# Patient Record
Sex: FEMALE | Race: BLACK OR AFRICAN AMERICAN | Employment: UNEMPLOYED | URBAN - METROPOLITAN AREA
[De-identification: names, ages, dates, MRNs, and addresses within clinical notes are randomized per-mention and may not be internally consistent; named-entity substitution may affect disease eponyms.]

---

## 2019-12-04 ENCOUNTER — APPOINTMENT (OUTPATIENT)
Dept: GENERAL RADIOLOGY | Age: 69
End: 2019-12-04
Attending: EMERGENCY MEDICINE
Payer: MEDICARE

## 2019-12-04 ENCOUNTER — HOSPITAL ENCOUNTER (EMERGENCY)
Age: 69
Discharge: HOME | End: 2019-12-05
Attending: EMERGENCY MEDICINE
Payer: MEDICARE

## 2019-12-04 DIAGNOSIS — R07.89 MUSCULOSKELETAL CHEST PAIN: Primary | ICD-10-CM

## 2019-12-04 PROCEDURE — 80053 COMPREHEN METABOLIC PANEL: CPT

## 2019-12-04 PROCEDURE — 99285 EMERGENCY DEPT VISIT HI MDM: CPT

## 2019-12-04 PROCEDURE — 71046 X-RAY EXAM CHEST 2 VIEWS: CPT

## 2019-12-04 PROCEDURE — 85025 COMPLETE CBC W/AUTO DIFF WBC: CPT

## 2019-12-04 PROCEDURE — 93005 ELECTROCARDIOGRAM TRACING: CPT

## 2019-12-04 PROCEDURE — 83880 ASSAY OF NATRIURETIC PEPTIDE: CPT

## 2019-12-04 PROCEDURE — 85379 FIBRIN DEGRADATION QUANT: CPT

## 2019-12-04 PROCEDURE — 82550 ASSAY OF CK (CPK): CPT

## 2019-12-05 VITALS
DIASTOLIC BLOOD PRESSURE: 86 MMHG | RESPIRATION RATE: 23 BRPM | WEIGHT: 125 LBS | OXYGEN SATURATION: 100 % | HEART RATE: 66 BPM | HEIGHT: 62 IN | BODY MASS INDEX: 23 KG/M2 | SYSTOLIC BLOOD PRESSURE: 179 MMHG | TEMPERATURE: 98.2 F

## 2019-12-05 LAB
ALBUMIN SERPL-MCNC: 3.6 G/DL (ref 3.4–5)
ALBUMIN/GLOB SERPL: 1 {RATIO} (ref 0.8–1.7)
ALP SERPL-CCNC: 105 U/L (ref 45–117)
ALT SERPL-CCNC: 25 U/L (ref 13–56)
ANION GAP SERPL CALC-SCNC: 6 MMOL/L (ref 3–18)
AST SERPL-CCNC: 22 U/L (ref 10–38)
BASOPHILS # BLD: 0 K/UL (ref 0–0.1)
BASOPHILS NFR BLD: 1 % (ref 0–2)
BILIRUB SERPL-MCNC: 0.3 MG/DL (ref 0.2–1)
BNP SERPL-MCNC: 2046 PG/ML (ref 0–900)
BUN SERPL-MCNC: 13 MG/DL (ref 7–18)
BUN/CREAT SERPL: 15 (ref 12–20)
CALCIUM SERPL-MCNC: 8.6 MG/DL (ref 8.5–10.1)
CHLORIDE SERPL-SCNC: 109 MMOL/L (ref 100–111)
CK MB CFR SERPL CALC: 2.4 % (ref 0–4)
CK MB CFR SERPL CALC: 3.4 % (ref 0–4)
CK MB SERPL-MCNC: 3.8 NG/ML (ref 5–25)
CK MB SERPL-MCNC: 4.7 NG/ML (ref 5–25)
CK SERPL-CCNC: 137 U/L (ref 26–192)
CK SERPL-CCNC: 157 U/L (ref 26–192)
CO2 SERPL-SCNC: 26 MMOL/L (ref 21–32)
CREAT SERPL-MCNC: 0.89 MG/DL (ref 0.6–1.3)
D DIMER PPP FEU-MCNC: 0.27 UG/ML(FEU)
DIFFERENTIAL METHOD BLD: ABNORMAL
EOSINOPHIL # BLD: 0.1 K/UL (ref 0–0.4)
EOSINOPHIL NFR BLD: 2 % (ref 0–5)
ERYTHROCYTE [DISTWIDTH] IN BLOOD BY AUTOMATED COUNT: 13.5 % (ref 11.6–14.5)
GLOBULIN SER CALC-MCNC: 3.5 G/DL (ref 2–4)
GLUCOSE SERPL-MCNC: 104 MG/DL (ref 74–99)
HCT VFR BLD AUTO: 42.5 % (ref 35–45)
HGB BLD-MCNC: 13.8 G/DL (ref 12–16)
LYMPHOCYTES # BLD: 1.8 K/UL (ref 0.9–3.6)
LYMPHOCYTES NFR BLD: 32 % (ref 21–52)
MCH RBC QN AUTO: 28.3 PG (ref 24–34)
MCHC RBC AUTO-ENTMCNC: 32.5 G/DL (ref 31–37)
MCV RBC AUTO: 87.3 FL (ref 74–97)
MONOCYTES # BLD: 0.7 K/UL (ref 0.05–1.2)
MONOCYTES NFR BLD: 12 % (ref 3–10)
NEUTS SEG # BLD: 3.1 K/UL (ref 1.8–8)
NEUTS SEG NFR BLD: 53 % (ref 40–73)
PLATELET # BLD AUTO: 211 K/UL (ref 135–420)
PMV BLD AUTO: 11.2 FL (ref 9.2–11.8)
POTASSIUM SERPL-SCNC: 3.5 MMOL/L (ref 3.5–5.5)
PROT SERPL-MCNC: 7.1 G/DL (ref 6.4–8.2)
RBC # BLD AUTO: 4.87 M/UL (ref 4.2–5.3)
SODIUM SERPL-SCNC: 141 MMOL/L (ref 136–145)
TROPONIN I SERPL-MCNC: 0.02 NG/ML (ref 0–0.04)
TROPONIN I SERPL-MCNC: 0.02 NG/ML (ref 0–0.04)
WBC # BLD AUTO: 5.7 K/UL (ref 4.6–13.2)

## 2019-12-05 PROCEDURE — 93005 ELECTROCARDIOGRAM TRACING: CPT

## 2019-12-05 PROCEDURE — 82550 ASSAY OF CK (CPK): CPT

## 2019-12-05 NOTE — ED NOTES
RN in to assess pt. Repositioned for comfort. Additional needs assessed. No apparent distress. No additional needs expressed at this time. Resting comfortably, awaiting further orders/procedures.

## 2019-12-05 NOTE — ED TRIAGE NOTES
Pt presents to ED through triage with c/o left upper chest pain x 3days pt reports raking 13 bags of leaves 3 days ago. Pt denies SOB at this time. Pt speaking in complete sentences.

## 2019-12-05 NOTE — ED NOTES
Care assumed for discharge only. I have reviewed discharge instructions with the patient. The patient verbalized understanding. Patient armband removed and shredded. Pt in NAD at this time, no further needs expressed.

## 2019-12-05 NOTE — DISCHARGE INSTRUCTIONS
You were seen and evaluated in the Emergency Department. Please understand that your work up is not all encompassing and you should follow up with your primary care physician for further management and continuity of care. Please return to Emergency Department or seek medical attention immediately if you have acute worsening in your symptoms or develop chest pain, shortness of breath, repeated vomiting, fever, altered level of consciousness, coughing up blood, or start sweating and feel clammy. If you were prescribed any medicine for home, please take as prescribed by your health-care provider. If you were given any follow-up appointments or numbers to call, please do so as instructed. Avoid any tobacco products or excessive alcohol. Patient Education        Chest Pain: Care Instructions  Your Care Instructions    There are many things that can cause chest pain. Some are not serious and will get better on their own in a few days. But some kinds of chest pain need more testing and treatment. Your doctor may have recommended a follow-up visit in the next 8 to 12 hours. If you are not getting better, you may need more tests or treatment. Even though your doctor has released you, you still need to watch for any problems. The doctor carefully checked you, but sometimes problems can develop later. If you have new symptoms or if your symptoms do not get better, get medical care right away. If you have worse or different chest pain or pressure that lasts more than 5 minutes or you passed out (lost consciousness), call 911 or seek other emergency help right away. A medical visit is only one step in your treatment. Even if you feel better, you still need to do what your doctor recommends, such as going to all suggested follow-up appointments and taking medicines exactly as directed. This will help you recover and help prevent future problems. How can you care for yourself at home?   · Rest until you feel better. · Take your medicine exactly as prescribed. Call your doctor if you think you are having a problem with your medicine. · Do not drive after taking a prescription pain medicine. When should you call for help? Call 911 if:    · You passed out (lost consciousness).     · You have severe difficulty breathing.     · You have symptoms of a heart attack. These may include:  ? Chest pain or pressure, or a strange feeling in your chest.  ? Sweating. ? Shortness of breath. ? Nausea or vomiting. ? Pain, pressure, or a strange feeling in your back, neck, jaw, or upper belly or in one or both shoulders or arms. ? Lightheadedness or sudden weakness. ? A fast or irregular heartbeat. After you call 911, the  may tell you to chew 1 adult-strength or 2 to 4 low-dose aspirin. Wait for an ambulance. Do not try to drive yourself.    Call your doctor today if:    · You have any trouble breathing.     · Your chest pain gets worse.     · You are dizzy or lightheaded, or you feel like you may faint.     · You are not getting better as expected.     · You are having new or different chest pain. Where can you learn more? Go to http://issac-sheela.info/. Enter A120 in the search box to learn more about \"Chest Pain: Care Instructions. \"  Current as of: June 26, 2019  Content Version: 12.2  © 1796-9849 [employer]. Care instructions adapted under license by [employer] (which disclaims liability or warranty for this information). If you have questions about a medical condition or this instruction, always ask your healthcare professional. [address] any warranty or liability for your use of this information.

## 2019-12-05 NOTE — ED PROVIDER NOTES
EMERGENCY DEPARTMENT HISTORY AND PHYSICAL EXAM    Date: 12/4/2019  Patient Name: Araceli Murcia    History of Presenting Illness     Chief Complaint   Patient presents with    Chest Pain         History Provided By: Patient    Additional History (Context):   Araceli Murcia is a 68 y.o. female denies any past medical history, does not take any medications on a daily basis presents to the emergency department C/O left-sided chest pain that is intermittent since raking 13 back so believes 3 days ago. Reports pain with movement. Patient reports that she has been taken Tylenol with some relief. Denies any shortness of breath, diaphoresis, arm pain, jaw pain. States that her son insisted that she come to the emergency department to be evaluated. Pt denies normal pain, nausea, vomiting, and any other sxs or complaints. PCP: Shawn Dias MD        Past History     Past Medical History:  Past Medical History:   Diagnosis Date    Brain tumor ([address]     Hypertension        Past Surgical History:  Past Surgical History:   Procedure Laterality Date    NEUROLOGICAL PROCEDURE UNLISTED      Brain tumor       Family History:  No family history on file. Social History:  Social History     Tobacco Use    Smoking status: Never Smoker   Substance Use Topics    Alcohol use: No    Drug use: No       Allergies:  No Known Allergies      Review of Systems   Review of Systems   Constitutional: Negative for chills and fever. HENT: Negative for congestion, ear pain, sinus pain and sore throat. Eyes: Negative for pain and visual disturbance. Respiratory: Negative for cough and shortness of breath. Cardiovascular: Positive for chest pain. Negative for leg swelling. Gastrointestinal: Negative for abdominal pain, constipation, diarrhea, nausea and vomiting. Genitourinary: Negative for dysuria, hematuria, vaginal bleeding and vaginal discharge. Musculoskeletal: Negative for back pain and neck pain. Skin: Negative for rash and wound. Neurological: Negative for dizziness, tremors, weakness, light-headedness and numbness. All other systems reviewed and are negative. Physical Exam     Vitals:    12/04/19 2335 12/05/19 0109   BP: (!) 195/133 190/89   Pulse: 90 71   Resp: 18 20   Temp: 98.2 °F (36.8 °C)    SpO2: 100% 100%   Weight: 56.7 kg (125 lb)    Height: 5' 2\" (1.575 m)      Physical Exam  Chest:             Nursing note and vitals reviewed    Constitutional: Elderly -American female, no acute distress  Head: Normocephalic, Atraumatic  Eyes: Pupils are equal, round, and reactive to light, EOMI  Neck: Supple, non-tender  Cardiovascular: Regular rate and rhythm, no murmurs, rubs, or gallops  Chest: Normal work of breathing and chest excursion bilaterally  Lungs: Clear to ausculation bilaterally, no wheezes, no rhonchi  Abdomen: Soft, non tender, non distended, normoactive bowel sounds  Back: No evidence of trauma or deformity  Extremities: No evidence of trauma or deformity, no LE edema. No streaking erythema, vesicular lesions, ulcerations or bulla  Skin: Warm and dry, normal cap refill  Neuro: Alert and appropriate, CN intact, normal speech, moving all 4 extremities freely and symmetrically  Psychiatric: Normal mood and affect       Diagnostic Study Results     Labs -     Recent Results (from the past 12 hour(s))   CBC WITH AUTOMATED DIFF    Collection Time: 12/04/19 11:50 PM   Result Value Ref Range    WBC 5.7 4.6 - 13.2 K/uL    RBC 4.87 4.20 - 5.30 M/uL    HGB 13.8 12.0 - 16.0 g/dL    HCT 42.5 35.0 - 45.0 %    MCV 87.3 74.0 - 97.0 FL    MCH 28.3 24.0 - 34.0 PG    MCHC 32.5 31.0 - 37.0 g/dL    RDW 13.5 11.6 - 14.5 %    PLATELET 499 791 - 013 K/uL    MPV 11.2 9.2 - 11.8 FL    NEUTROPHILS 53 40 - 73 %    LYMPHOCYTES 32 21 - 52 %    MONOCYTES 12 (H) 3 - 10 %    EOSINOPHILS 2 0 - 5 %    BASOPHILS 1 0 - 2 %    ABS. NEUTROPHILS 3.1 1.8 - 8.0 K/UL    ABS. LYMPHOCYTES 1.8 0.9 - 3.6 K/UL    ABS. MONOCYTES 0.7 0.05 - 1.2 K/UL    ABS. EOSINOPHILS 0.1 0.0 - 0.4 K/UL    ABS. BASOPHILS 0.0 0.0 - 0.1 K/UL    DF AUTOMATED     CARDIAC PANEL,(CK, CKMB & TROPONIN)    Collection Time: 12/04/19 11:50 PM   Result Value Ref Range     26 - 192 U/L    CK - MB 4.7 (H) <3.6 ng/ml    CK-MB Index 3.4 0.0 - 4.0 %    Troponin-I, QT 0.02 0.0 - 3.790 NG/ML   METABOLIC PANEL, COMPREHENSIVE    Collection Time: 12/04/19 11:50 PM   Result Value Ref Range    Sodium 141 136 - 145 mmol/L    Potassium 3.5 3.5 - 5.5 mmol/L    Chloride 109 100 - 111 mmol/L    CO2 26 21 - 32 mmol/L    Anion gap 6 3.0 - 18 mmol/L    Glucose 104 (H) 74 - 99 mg/dL    BUN 13 7.0 - 18 MG/DL    Creatinine 0.89 0.6 - 1.3 MG/DL    BUN/Creatinine ratio 15 12 - 20      GFR est AA >60 >60 ml/min/1.73m2    GFR est non-AA >60 >60 ml/min/1.73m2    Calcium 8.6 8.5 - 10.1 MG/DL    Bilirubin, total 0.3 0.2 - 1.0 MG/DL    ALT (SGPT) 25 13 - 56 U/L    AST (SGOT) 22 10 - 38 U/L    Alk.  phosphatase 105 45 - 117 U/L    Protein, total 7.1 6.4 - 8.2 g/dL    Albumin 3.6 3.4 - 5.0 g/dL    Globulin 3.5 2.0 - 4.0 g/dL    A-G Ratio 1.0 0.8 - 1.7     D DIMER    Collection Time: 12/04/19 11:50 PM   Result Value Ref Range    D DIMER 0.27 <0.46 ug/ml(FEU)   NT-PRO BNP    Collection Time: 12/04/19 11:50 PM   Result Value Ref Range    NT pro-BNP 2,046 (H) 0 - 900 PG/ML   EKG, 12 LEAD, SUBSEQUENT    Collection Time: 12/05/19  1:03 AM   Result Value Ref Range    Ventricular Rate 65 BPM    Atrial Rate 65 BPM    P-R Interval 126 ms    QRS Duration 96 ms    Q-T Interval 428 ms    QTC Calculation (Bezet) 445 ms    Calculated P Axis 44 degrees    Calculated R Axis -7 degrees    Calculated T Axis 53 degrees    Diagnosis       Normal sinus rhythm  Left atrial enlargement  Left ventricular hypertrophy  Cannot rule out Septal infarct , age undetermined  T wave abnormality, consider lateral ischemia  Abnormal ECG  When compared with ECG of 19-MAY-2016 03:04,  T wave inversion more evident in Lateral leads     CARDIAC PANEL,(CK, CKMB & TROPONIN)    Collection Time: 12/05/19  1:05 AM   Result Value Ref Range     26 - 192 U/L    CK - MB 3.8 (H) <3.6 ng/ml    CK-MB Index 2.4 0.0 - 4.0 %    Troponin-I, QT 0.02 0.0 - 0.045 NG/ML       Radiologic Studies -   XR CHEST PA LAT    (Results Pending)   RADIOLOGY FINDINGS  Chest x-ray shows cardiomegaly but no acute process  Pending review by Radiologist  Recorded by Juan Vasquez, DO      CT Results  (Last 48 hours)    None        CXR Results  (Last 48 hours)    None            Medical Decision Making   I am the first provider for this patient. I reviewed the vital signs, available nursing notes, past medical history, past surgical history, family history and social history. Vital Signs-Reviewed the patient's vital signs. Pulse Oximetry Analysis -100 % on room air    Cardiac Monitor:  Rate: 90 bpm  Rhythm: Regular    EKG interpretation: (Preliminary)  11:54 PM   Sinus rhythm with PVCs at 80 bpm.  QTc 475 ms. No acute ST elevation    EKG interpretation: (Preliminary)  1:10 AM   Normal sinus rhythm at 65 bpm.  QTc 445 ms. No acute ST elevation    Records Reviewed: Nursing Notes and Old Medical Records    Provider Notes:   68 y.o. female presenting with left-sided chest pain after raking 13 back to believe 3 days ago. On exam patient is afebrile and not tachycardic. However noted to be hypertensive. Patient with no known history of hypertension. EKG showed no acute ST changes or T wave abnomalities concerning for ischemia. We will complete 2 sets of cardiac enzymes to evaluate for ACS. Differential includes gastritis, GERD, PUD, and must less likely, ACS. We will also obtain chest x-ray to evaluate for any pulmonary pathology. Patient is not tachycardic, no c/o of calf pain and not hypoxic and hx is not concerning for PE. Patient cannot be PERC out due to age however is low risk on Well's, will evaluate with D dimer.  Patient has minimal risk factors and a low HEART score of 2 for age, with low risk of MACE. I anticipate with no ST changes or T wave abnormalities, and negative Troponins that patient may be discharged for outpatient follow up. Procedures:  Procedures    ED Course:   11:54 PM   Initial assessment performed. The patients presenting problems have been discussed, and they are in agreement with the care plan formulated and outlined with them. I have encouraged them to ask questions as they arise throughout their visit. 1:34 AM  2 sets of cardiac enzymes were negative. D-dimer within normal limits. CXR NAP. On re-assessment pt reports improved sx. After at length discussion with patient in regards to HEART score of 2, minimal risk of MACE and collaborative shared decision making, discharge and close outpatient follow up was deemed to be appropriate and patient was in agreement to discharge plan. Diagnosis and Disposition       DISCHARGE NOTE:  1:34 AM    Delicia Brandt's  results have been reviewed with her. She has been counseled regarding her diagnosis, treatment, and plan. She verbally conveys understanding and agreement of the signs, symptoms, diagnosis, treatment and prognosis and additionally agrees to follow up as discussed. She also agrees with the care-plan and conveys that all of her questions have been answered. I have also provided discharge instructions for her that include: educational information regarding their diagnosis and treatment, and list of reasons why they would want to return to the ED prior to their follow-up appointment, should her condition change. She has been provided with education for proper emergency department utilization. CLINICAL IMPRESSION:    1. Musculoskeletal chest pain        PLAN:  1. D/C Home  2. There are no discharge medications for this patient.     3.   Follow-up Information     Follow up With Specialties Details Why Contact Info    Kaur Vargas MD Internal Medicine Schedule an appointment as soon as possible for a visit in 2 days  [address]  [address]      Susana Candelario MD Cardiology, Internal Medicine Schedule an appointment as soon as possible for a visit in 2 days  [address] [address]  [phone]      [facility] Emergency Medicine  As needed if symptoms worsen [address] [place] [address]  [phone]        ____________________________________     Please note that this dictation was completed with [employer], the computer voice recognition software. Quite often unanticipated grammatical, syntax, homophones, and other interpretive errors are inadvertently transcribed by the computer software. Please disregard these errors. Please excuse any errors that have escaped final proofreading.

## 2019-12-08 LAB
ATRIAL RATE: 65 BPM
ATRIAL RATE: 80 BPM
CALCULATED P AXIS, ECG09: 36 DEGREES
CALCULATED P AXIS, ECG09: 44 DEGREES
CALCULATED R AXIS, ECG10: -7 DEGREES
CALCULATED R AXIS, ECG10: -9 DEGREES
CALCULATED T AXIS, ECG11: 13 DEGREES
CALCULATED T AXIS, ECG11: 53 DEGREES
DIAGNOSIS, 93000: NORMAL
DIAGNOSIS, 93000: NORMAL
P-R INTERVAL, ECG05: 124 MS
P-R INTERVAL, ECG05: 126 MS
Q-T INTERVAL, ECG07: 412 MS
Q-T INTERVAL, ECG07: 428 MS
QRS DURATION, ECG06: 96 MS
QRS DURATION, ECG06: 98 MS
QTC CALCULATION (BEZET), ECG08: 445 MS
QTC CALCULATION (BEZET), ECG08: 475 MS
VENTRICULAR RATE, ECG03: 65 BPM
VENTRICULAR RATE, ECG03: 80 BPM

## 2019-12-09 ENCOUNTER — HOSPITAL ENCOUNTER (EMERGENCY)
Age: 69
Discharge: HOME | End: 2019-12-09
Attending: EMERGENCY MEDICINE
Payer: MEDICARE

## 2019-12-09 VITALS
WEIGHT: 124 LBS | HEART RATE: 77 BPM | TEMPERATURE: 99.2 F | OXYGEN SATURATION: 100 % | BODY MASS INDEX: 22.82 KG/M2 | DIASTOLIC BLOOD PRESSURE: 85 MMHG | HEIGHT: 62 IN | RESPIRATION RATE: 20 BRPM | SYSTOLIC BLOOD PRESSURE: 175 MMHG

## 2019-12-09 DIAGNOSIS — B02.9 HERPES ZOSTER WITHOUT COMPLICATION: Primary | ICD-10-CM

## 2019-12-09 PROCEDURE — 99282 EMERGENCY DEPT VISIT SF MDM: CPT

## 2019-12-09 RX ORDER — VALACYCLOVIR HYDROCHLORIDE 1 G/1
1000 TABLET, FILM COATED ORAL 3 TIMES DAILY
Qty: 21 TAB | Refills: 0 | Status: SHIPPED | OUTPATIENT
Start: 2019-12-09 | End: 2019-12-16

## 2019-12-09 NOTE — DISCHARGE INSTRUCTIONS
Patient Education        Shingles: Care Instructions  Your Care Instructions    Shingles (herpes zoster) causes pain and a blistered rash. The rash can appear anywhere on the body but will be on only one side of the body, the left or right. It will be in a band, a strip, or a small area. The pain can be very severe. Shingles can also cause tingling or itching in the area of the rash. The blisters scab over after a few days and heal in 2 to 4 weeks. Medicines can help you feel better and may help prevent more serious problems caused by shingles. Shingles is caused by the same virus that causes chickenpox. When you have chickenpox, the virus gets into your nerve roots and stays there (becomes dormant) long after you get over the chickenpox. If the virus becomes active again, it can cause shingles. Follow-up care is a key part of your treatment and safety. Be sure to make and go to all appointments, and call your doctor if you are having problems. It's also a good idea to know your test results and keep a list of the medicines you take. How can you care for yourself at home? · Be safe with medicines. Take your medicines exactly as prescribed. Call your doctor if you think you are having a problem with your medicine. Antiviral medicine helps you get better faster. · Try not to scratch or pick at the blisters. They will crust over and fall off on their own if you leave them alone. · Put cool, wet cloths on the area to relieve pain and itching. You can also use calamine lotion. Try not to use so much lotion that it cakes and is hard to get off. · Put cornstarch or baking soda on the sores to help dry them out so they heal faster. · Do not use thick ointment, such as petroleum jelly, on the sores. This will keep them from drying and healing. · To help remove loose crusts, soak them in tap water. This can help decrease oozing, and dry and soothe the skin.   · Take an over-the-counter pain medicine, such as acetaminophen (Tylenol), ibuprofen (Advil, Motrin), or naproxen (Aleve). Read and follow all instructions on the label. · Avoid close contact with people until the blisters have healed. It is very important for you to avoid contact with anyone who has never had chickenpox or the chickenpox vaccine. Pregnant women, young babies, and anyone else who has a hard time fighting infection (such as someone with HIV, diabetes, or cancer) is especially at risk. When should you call for help? Call your doctor now or seek immediate medical care if:    · You have a new or higher fever.     · You have a severe headache and a stiff neck.     · You lose the ability to think clearly.     · The rash spreads to your forehead, nose, eyes, or eyelids.     · You have eye pain, or your vision gets worse.     · You have new pain in your face, or you cannot move the muscles in your face.     · Blisters spread to new parts of your body.    Watch closely for changes in your health, and be sure to contact your doctor if:    · The rash has not healed after 2 to 4 weeks.     · You still have pain after the rash has healed. Where can you learn more? Go to http://issac-sheela.info/. Ryan Cunningham in the search box to learn more about \"Shingles: Care Instructions. \"  Current as of: June 9, 2019  Content Version: 12.2  © 4483-0898 [employer]. Care instructions adapted under license by [employer] (which disclaims liability or warranty for this information). If you have questions about a medical condition or this instruction, always ask your healthcare professional. [address] any warranty or liability for your use of this information.

## 2019-12-09 NOTE — LETTER
[facility] [place] 
[facility] EMERGENCY DEPT 
[address] [address] [phone] Work/School Note Date: 12/9/2019 To Whom It May concern: 
 
Brenda Munoz was seen and treated today in the emergency room by the following provider(s): 
Attending Provider: Veda Robles MD 
Physician Assistant: KIRK Wong. Brenda Munoz may be excused from work for 2 days Sincerely, 
 
 
 
 
KIKR Hughes

## 2019-12-09 NOTE — ED PROVIDER NOTES
EMERGENCY DEPARTMENT HISTORY AND PHYSICAL EXAM    Date: 12/9/2019  Patient Name: Gary Shirley    History of Presenting Illness     Chief Complaint   Patient presents with    Rash         History Provided By: Patient    Gary Shirley is a 68 y.o. female who presents to the emergency department C/O rash. Patient states she was seen in this facility 5 days ago for some left-sided chest pain. Patient states she thought that maybe she had pulled a muscle while raking leaves. Patient states she had some tests done and they put some stickers on her chest and shortly after she developed some small itchy area on the left side of her chest wall. At that time she was told that she was having an allergic reaction to the stickers placed on her chest wall. Patient states the next couple of days she has had increasing pain and a rash to the left side of her chest.  Patient expresses fear for possible allergic reaction. Pt denies fall, new injury, shortness of breath, fever, and any other sxs or complaints. PCP: Kuldeep Anders MD        Past History     Past Medical History:  Past Medical History:   Diagnosis Date    [facility] tumor ([facility] [address]     Hypertension        Past Surgical History:  Past Surgical History:   Procedure Laterality Date    NEUROLOGICAL PROCEDURE UNLISTED      Brain tumor       Family History:  History reviewed. No pertinent family history. Social History:  Social History     Tobacco Use    Smoking status: Never Smoker    Smokeless tobacco: Never Used   Substance Use Topics    Alcohol use: No    Drug use: No       Allergies:  No Known Allergies      Review of Systems   Review of Systems   Constitutional: Negative for fever. Respiratory: Negative for shortness of breath. Cardiovascular: Positive for chest pain. Skin: Positive for rash. All other systems reviewed and are negative.       Physical Exam     Vitals:    12/09/19 1641   BP: 175/85   Pulse: 77   Resp: 20   Temp: 99.2 °F (37.3 °C)   SpO2: 100%   Weight: 56.2 kg (124 lb)   Height: 5' 2\" (1.575 m)     Physical Exam  Nursing note reviewed. Constitutional:       General: She is not in acute distress. Appearance: Normal appearance. She is not ill-appearing. HENT:      Head: Normocephalic and atraumatic. Nose: Nose normal.   Eyes:      Extraocular Movements: Extraocular movements intact. Conjunctiva/sclera: Conjunctivae normal.      Pupils: Pupils are equal, round, and reactive to light. Neck:      Musculoskeletal: Normal range of motion and neck supple. Cardiovascular:      Rate and Rhythm: Normal rate and regular rhythm. Pulmonary:      Effort: Pulmonary effort is normal.      Breath sounds: Normal breath sounds. Musculoskeletal: Normal range of motion. Skin:     General: Skin is warm. Capillary Refill: Capillary refill takes less than 2 seconds. Findings: Rash present. Comments: Vesicular rash in above distribution consistent with zoster   Neurological:      General: No focal deficit present. Mental Status: She is alert and oriented to person, place, and time. Psychiatric:         Mood and Affect: Mood normal.         Behavior: Behavior normal.           Diagnostic Study Results     Labs -   No results found for this or any previous visit (from the past 12 hour(s)). Radiologic Studies -   No orders to display     CT Results  (Last 48 hours)    None        CXR Results  (Last 48 hours)    None          Medications given in the ED-  Medications - No data to display      Medical Decision Making   I am the first provider for this patient. I reviewed the vital signs, available nursing notes, past medical history, past surgical history, family history and social history. Vital Signs-Reviewed the patient's vital signs. Records Reviewed: Nursing Notes    Procedures:  Procedures    ED Course:   5:50 PM   Initial assessment performed.  The patients presenting problems have been discussed, and they are in agreement with the care plan formulated and outlined with them. I have encouraged them to ask questions as they arise throughout their visit. Discussion: 68 y.o. female uncomplicated zoster rash. Plan for antivirals. PCP follow-up and strict return precautions discussed. Diagnosis and Disposition       DISCHARGE NOTE:  Haroon Brandt's  results have been reviewed with her. She has been counseled regarding her diagnosis, treatment, and plan. She verbally conveys understanding and agreement of the signs, symptoms, diagnosis, treatment and prognosis and additionally agrees to follow up as discussed. She also agrees with the care-plan and conveys that all of her questions have been answered. I have also provided discharge instructions for her that include: educational information regarding their diagnosis and treatment, and list of reasons why they would want to return to the ED prior to their follow-up appointment, should her condition change. She has been provided with education for proper emergency department utilization. CLINICAL IMPRESSION:    1. Herpes zoster without complication        PLAN:  1. D/C Home  2. Current Discharge Medication List      START taking these medications    Details   valACYclovir (VALTREX) 1 gram tablet Take 1 Tab by mouth three (3) times daily for 7 days. Qty: 21 Tab, Refills: 0           3. Follow-up Information     Follow up With Specialties Details Why Contact Info    Irena Vargas MD Internal Medicine Schedule an appointment as soon as possible for a visit  [address] [place] [address],[address]      [facility] EMERGENCY DEPT Emergency Medicine  As needed, If symptoms worsen [address] [place] [id number] [phone]                  Please note that this dictation was completed with [employer], the [employer] voice recognition software.   Quite often unanticipated grammatical, syntax, homophones, and other interpretive errors are inadvertently transcribed by the computer software. Please disregard these errors. Please excuse any errors that have escaped final proofreading.

## 2019-12-09 NOTE — ED TRIAGE NOTES
Patient was seen here the other night for chest pain and states that she had a reaction to the EKG leads. Patient noted to have blisters to the left side under breast and on stomach.

## 2020-05-30 ENCOUNTER — APPOINTMENT (OUTPATIENT)
Dept: GENERAL RADIOLOGY | Age: 70
DRG: 193 | End: 2020-05-30
Attending: EMERGENCY MEDICINE
Payer: MEDICARE

## 2020-05-30 ENCOUNTER — HOSPITAL ENCOUNTER (INPATIENT)
Age: 70
LOS: 2 days | Discharge: HOME | DRG: 193 | End: 2020-06-01
Attending: EMERGENCY MEDICINE | Admitting: HOSPITALIST
Payer: MEDICARE

## 2020-05-30 DIAGNOSIS — J96.01 ACUTE RESPIRATORY FAILURE WITH HYPOXIA (HCC): Primary | ICD-10-CM

## 2020-05-30 DIAGNOSIS — Z20.822 SUSPECTED COVID-19 VIRUS INFECTION: ICD-10-CM

## 2020-05-30 DIAGNOSIS — R09.02 HYPOXIA: ICD-10-CM

## 2020-05-30 PROBLEM — J96.00 ACUTE RESPIRATORY FAILURE (HCC): Status: ACTIVE | Noted: 2020-05-30

## 2020-05-30 LAB
ALBUMIN SERPL-MCNC: 3.6 G/DL (ref 3.4–5)
ALBUMIN/GLOB SERPL: 0.9 {RATIO} (ref 0.8–1.7)
ALP SERPL-CCNC: 126 U/L (ref 45–117)
ALT SERPL-CCNC: 79 U/L (ref 13–56)
ANION GAP SERPL CALC-SCNC: 8 MMOL/L (ref 3–18)
APPEARANCE UR: CLEAR
APTT PPP: <20 SEC (ref 23–36.4)
ARTERIAL PATENCY WRIST A: ABNORMAL
AST SERPL-CCNC: 73 U/L (ref 10–38)
ATRIAL RATE: 126 BPM
B PERT DNA SPEC QL NAA+PROBE: NOT DETECTED
BACTERIA URNS QL MICRO: ABNORMAL /HPF
BASE EXCESS BLD CALC-SCNC: 0 MMOL/L
BASOPHILS # BLD: 0 K/UL (ref 0–0.1)
BASOPHILS NFR BLD: 0 % (ref 0–2)
BDY SITE: ABNORMAL
BILIRUB SERPL-MCNC: 0.5 MG/DL (ref 0.2–1)
BILIRUB UR QL: NEGATIVE
BNP SERPL-MCNC: 5062 PG/ML (ref 0–900)
BORDETELLA PARAPERTUSSIS PCR, BORPAR: NOT DETECTED
BUN SERPL-MCNC: 19 MG/DL (ref 7–18)
BUN/CREAT SERPL: 22 (ref 12–20)
C PNEUM DNA SPEC QL NAA+PROBE: NOT DETECTED
CALCIUM SERPL-MCNC: 9.2 MG/DL (ref 8.5–10.1)
CALCULATED P AXIS, ECG09: 67 DEGREES
CALCULATED R AXIS, ECG10: 34 DEGREES
CALCULATED T AXIS, ECG11: 150 DEGREES
CHLORIDE SERPL-SCNC: 110 MMOL/L (ref 100–111)
CK MB CFR SERPL CALC: 2.5 % (ref 0–4)
CK MB SERPL-MCNC: 3.7 NG/ML (ref 5–25)
CK SERPL-CCNC: 146 U/L (ref 26–192)
CO2 SERPL-SCNC: 22 MMOL/L (ref 21–32)
COLOR UR: YELLOW
CREAT SERPL-MCNC: 0.88 MG/DL (ref 0.6–1.3)
CRP SERPL-MCNC: <0.3 MG/DL (ref 0–0.3)
D DIMER PPP FEU-MCNC: >20 UG/ML(FEU)
DIAGNOSIS, 93000: NORMAL
DIFFERENTIAL METHOD BLD: NORMAL
EOSINOPHIL # BLD: 0.1 K/UL (ref 0–0.4)
EOSINOPHIL NFR BLD: 2 % (ref 0–5)
EPITH CASTS URNS QL MICRO: ABNORMAL /LPF (ref 0–5)
ERYTHROCYTE [DISTWIDTH] IN BLOOD BY AUTOMATED COUNT: 13.9 % (ref 11.6–14.5)
FERRITIN SERPL-MCNC: 63 NG/ML (ref 8–388)
FIBRINOGEN PPP-MCNC: 261 MG/DL (ref 210–451)
FLUAV H1 2009 PAND RNA SPEC QL NAA+PROBE: NOT DETECTED
FLUAV H1 RNA SPEC QL NAA+PROBE: NOT DETECTED
FLUAV H3 RNA SPEC QL NAA+PROBE: NOT DETECTED
FLUAV SUBTYP SPEC NAA+PROBE: NOT DETECTED
FLUBV RNA SPEC QL NAA+PROBE: NOT DETECTED
GAS FLOW.O2 O2 DELIVERY SYS: ABNORMAL L/MIN
GLOBULIN SER CALC-MCNC: 3.9 G/DL (ref 2–4)
GLUCOSE SERPL-MCNC: 226 MG/DL (ref 74–99)
GLUCOSE UR STRIP.AUTO-MCNC: NEGATIVE MG/DL
HADV DNA SPEC QL NAA+PROBE: NOT DETECTED
HCO3 BLD-SCNC: 25.2 MMOL/L (ref 22–26)
HCOV 229E RNA SPEC QL NAA+PROBE: NOT DETECTED
HCOV HKU1 RNA SPEC QL NAA+PROBE: NOT DETECTED
HCOV NL63 RNA SPEC QL NAA+PROBE: NOT DETECTED
HCOV OC43 RNA SPEC QL NAA+PROBE: NOT DETECTED
HCT VFR BLD AUTO: 42.2 % (ref 35–45)
HGB BLD-MCNC: 13.5 G/DL (ref 12–16)
HGB UR QL STRIP: NEGATIVE
HMPV RNA SPEC QL NAA+PROBE: NOT DETECTED
HPIV1 RNA SPEC QL NAA+PROBE: NOT DETECTED
HPIV2 RNA SPEC QL NAA+PROBE: NOT DETECTED
HPIV3 RNA SPEC QL NAA+PROBE: NOT DETECTED
HPIV4 RNA SPEC QL NAA+PROBE: NOT DETECTED
INR PPP: 1.1 (ref 0.8–1.2)
KETONES UR QL STRIP.AUTO: NEGATIVE MG/DL
L PNEUMO AG UR QL IA: NEGATIVE
LACTATE SERPL-SCNC: 1.8 MMOL/L (ref 0.4–2)
LDH SERPL L TO P-CCNC: 272 U/L (ref 81–234)
LEUKOCYTE ESTERASE UR QL STRIP.AUTO: ABNORMAL
LYMPHOCYTES # BLD: 1.1 K/UL (ref 0.9–3.6)
LYMPHOCYTES NFR BLD: 21 % (ref 21–52)
M PNEUMO DNA SPEC QL NAA+PROBE: NOT DETECTED
MAGNESIUM SERPL-MCNC: 2 MG/DL (ref 1.6–2.6)
MCH RBC QN AUTO: 28.8 PG (ref 24–34)
MCHC RBC AUTO-ENTMCNC: 32 G/DL (ref 31–37)
MCV RBC AUTO: 90 FL (ref 74–97)
MONOCYTES # BLD: 0.3 K/UL (ref 0.05–1.2)
MONOCYTES NFR BLD: 6 % (ref 3–10)
MUCOUS THREADS URNS QL MICRO: POSITIVE /LPF
NEUTS SEG # BLD: 3.7 K/UL (ref 1.8–8)
NEUTS SEG NFR BLD: 71 % (ref 40–73)
NITRITE UR QL STRIP.AUTO: NEGATIVE
O2/TOTAL GAS SETTING VFR VENT: 100 %
P-R INTERVAL, ECG05: 122 MS
PCO2 BLD: 46.1 MMHG (ref 35–45)
PH BLD: 7.35 [PH] (ref 7.35–7.45)
PH UR STRIP: 7 [PH] (ref 5–8)
PHOSPHATE SERPL-MCNC: 3.1 MG/DL (ref 2.5–4.9)
PLATELET # BLD AUTO: 201 K/UL (ref 135–420)
PMV BLD AUTO: 11.2 FL (ref 9.2–11.8)
PO2 BLD: 173 MMHG (ref 80–100)
POTASSIUM SERPL-SCNC: 3.6 MMOL/L (ref 3.5–5.5)
PROCALCITONIN SERPL-MCNC: <0.05 NG/ML
PROT SERPL-MCNC: 7.5 G/DL (ref 6.4–8.2)
PROT UR STRIP-MCNC: ABNORMAL MG/DL
PROTHROMBIN TIME: 13.6 SEC (ref 11.5–15.2)
Q-T INTERVAL, ECG07: 322 MS
QRS DURATION, ECG06: 92 MS
QTC CALCULATION (BEZET), ECG08: 466 MS
RBC # BLD AUTO: 4.69 M/UL (ref 4.2–5.3)
RBC #/AREA URNS HPF: ABNORMAL /HPF (ref 0–5)
RSV RNA SPEC QL NAA+PROBE: NOT DETECTED
RV+EV RNA SPEC QL NAA+PROBE: NOT DETECTED
S PNEUM AG UR QL: NEGATIVE
SALICYLATES SERPL-MCNC: <1.7 MG/DL (ref 2.8–20)
SAO2 % BLD: 99 % (ref 92–97)
SERVICE CMNT-IMP: ABNORMAL
SODIUM SERPL-SCNC: 140 MMOL/L (ref 136–145)
SP GR UR REFRACTOMETRY: 1.02 (ref 1–1.03)
SPECIMEN TYPE: ABNORMAL
TOTAL RESP. RATE, ITRR: 36
TROPONIN I SERPL-MCNC: 0.03 NG/ML (ref 0–0.04)
UROBILINOGEN UR QL STRIP.AUTO: 0.2 EU/DL (ref 0.2–1)
VENTRICULAR RATE, ECG03: 126 BPM
WBC # BLD AUTO: 5.2 K/UL (ref 4.6–13.2)
WBC URNS QL MICRO: ABNORMAL /HPF (ref 0–5)

## 2020-05-30 PROCEDURE — 96375 TX/PRO/DX INJ NEW DRUG ADDON: CPT

## 2020-05-30 PROCEDURE — 82550 ASSAY OF CK (CPK): CPT

## 2020-05-30 PROCEDURE — 94761 N-INVAS EAR/PLS OXIMETRY MLT: CPT

## 2020-05-30 PROCEDURE — 82728 ASSAY OF FERRITIN: CPT

## 2020-05-30 PROCEDURE — 74011250636 HC RX REV CODE- 250/636: Performed by: FAMILY MEDICINE

## 2020-05-30 PROCEDURE — 77010033678 HC OXYGEN DAILY

## 2020-05-30 PROCEDURE — 82803 BLOOD GASES ANY COMBINATION: CPT

## 2020-05-30 PROCEDURE — 84145 PROCALCITONIN (PCT): CPT

## 2020-05-30 PROCEDURE — 80053 COMPREHEN METABOLIC PANEL: CPT

## 2020-05-30 PROCEDURE — 0100U RESPIRATORY PANEL,PCR,NASOPHARYNGEAL: CPT

## 2020-05-30 PROCEDURE — 36600 WITHDRAWAL OF ARTERIAL BLOOD: CPT

## 2020-05-30 PROCEDURE — 87450 LEGIONELLA PNEUMOPHILA AG, URINE: CPT

## 2020-05-30 PROCEDURE — 96374 THER/PROPH/DIAG INJ IV PUSH: CPT

## 2020-05-30 PROCEDURE — 87635 SARS-COV-2 COVID-19 AMP PRB: CPT

## 2020-05-30 PROCEDURE — 85384 FIBRINOGEN ACTIVITY: CPT

## 2020-05-30 PROCEDURE — 99285 EMERGENCY DEPT VISIT HI MDM: CPT

## 2020-05-30 PROCEDURE — 74011250636 HC RX REV CODE- 250/636: Performed by: EMERGENCY MEDICINE

## 2020-05-30 PROCEDURE — 86140 C-REACTIVE PROTEIN: CPT

## 2020-05-30 PROCEDURE — 83605 ASSAY OF LACTIC ACID: CPT

## 2020-05-30 PROCEDURE — 80307 DRUG TEST PRSMV CHEM ANLYZR: CPT

## 2020-05-30 PROCEDURE — 87040 BLOOD CULTURE FOR BACTERIA: CPT

## 2020-05-30 PROCEDURE — 71045 X-RAY EXAM CHEST 1 VIEW: CPT

## 2020-05-30 PROCEDURE — 85610 PROTHROMBIN TIME: CPT

## 2020-05-30 PROCEDURE — 87449 NOS EACH ORGANISM AG IA: CPT

## 2020-05-30 PROCEDURE — 83880 ASSAY OF NATRIURETIC PEPTIDE: CPT

## 2020-05-30 PROCEDURE — 83735 ASSAY OF MAGNESIUM: CPT

## 2020-05-30 PROCEDURE — 93005 ELECTROCARDIOGRAM TRACING: CPT

## 2020-05-30 PROCEDURE — 87086 URINE CULTURE/COLONY COUNT: CPT

## 2020-05-30 PROCEDURE — 65660000000 HC RM CCU STEPDOWN

## 2020-05-30 PROCEDURE — 85730 THROMBOPLASTIN TIME PARTIAL: CPT

## 2020-05-30 PROCEDURE — 85379 FIBRIN DEGRADATION QUANT: CPT

## 2020-05-30 PROCEDURE — 81001 URINALYSIS AUTO W/SCOPE: CPT

## 2020-05-30 PROCEDURE — 84100 ASSAY OF PHOSPHORUS: CPT

## 2020-05-30 PROCEDURE — 74011000250 HC RX REV CODE- 250: Performed by: EMERGENCY MEDICINE

## 2020-05-30 PROCEDURE — 83615 LACTATE (LD) (LDH) ENZYME: CPT

## 2020-05-30 PROCEDURE — 74011250637 HC RX REV CODE- 250/637: Performed by: FAMILY MEDICINE

## 2020-05-30 PROCEDURE — 85025 COMPLETE CBC W/AUTO DIFF WBC: CPT

## 2020-05-30 RX ORDER — ONDANSETRON 2 MG/ML
4 INJECTION INTRAMUSCULAR; INTRAVENOUS
Status: DISCONTINUED | OUTPATIENT
Start: 2020-05-30 | End: 2020-06-01 | Stop reason: HOSPADM

## 2020-05-30 RX ORDER — ASCORBIC ACID 250 MG
500 TABLET ORAL EVERY 6 HOURS
Status: DISCONTINUED | OUTPATIENT
Start: 2020-05-30 | End: 2020-06-01

## 2020-05-30 RX ORDER — ACETAMINOPHEN 325 MG/1
650 TABLET ORAL
Status: DISCONTINUED | OUTPATIENT
Start: 2020-05-30 | End: 2020-06-01 | Stop reason: HOSPADM

## 2020-05-30 RX ORDER — METOPROLOL TARTRATE 25 MG/1
12.5 TABLET, FILM COATED ORAL EVERY 12 HOURS
Status: DISCONTINUED | OUTPATIENT
Start: 2020-05-30 | End: 2020-06-01 | Stop reason: HOSPADM

## 2020-05-30 RX ORDER — ENOXAPARIN SODIUM 100 MG/ML
30 INJECTION SUBCUTANEOUS EVERY 12 HOURS
Status: DISCONTINUED | OUTPATIENT
Start: 2020-05-30 | End: 2020-06-01 | Stop reason: HOSPADM

## 2020-05-30 RX ORDER — HYDROCHLOROTHIAZIDE 25 MG/1
25 TABLET ORAL DAILY
Status: DISCONTINUED | OUTPATIENT
Start: 2020-05-30 | End: 2020-06-01 | Stop reason: HOSPADM

## 2020-05-30 RX ORDER — MELATONIN
2000 DAILY
Status: DISCONTINUED | OUTPATIENT
Start: 2020-05-30 | End: 2020-06-01

## 2020-05-30 RX ORDER — ZINC SULFATE 50(220)MG
1 CAPSULE ORAL DAILY
Status: DISCONTINUED | OUTPATIENT
Start: 2020-05-30 | End: 2020-06-01

## 2020-05-30 RX ORDER — CHOLECALCIFEROL (VITAMIN D3) 125 MCG
5 CAPSULE ORAL
Status: DISCONTINUED | OUTPATIENT
Start: 2020-05-30 | End: 2020-06-01

## 2020-05-30 RX ORDER — NALOXONE HYDROCHLORIDE 0.4 MG/ML
0.4 INJECTION, SOLUTION INTRAMUSCULAR; INTRAVENOUS; SUBCUTANEOUS AS NEEDED
Status: DISCONTINUED | OUTPATIENT
Start: 2020-05-30 | End: 2020-06-01 | Stop reason: HOSPADM

## 2020-05-30 RX ORDER — LORAZEPAM 2 MG/ML
1 INJECTION INTRAMUSCULAR
Status: COMPLETED | OUTPATIENT
Start: 2020-05-30 | End: 2020-05-30

## 2020-05-30 RX ORDER — OXYCODONE HYDROCHLORIDE 5 MG/1
5 TABLET ORAL
Status: DISCONTINUED | OUTPATIENT
Start: 2020-05-30 | End: 2020-06-01 | Stop reason: HOSPADM

## 2020-05-30 RX ORDER — SODIUM CHLORIDE 0.9 % (FLUSH) 0.9 %
5-10 SYRINGE (ML) INJECTION AS NEEDED
Status: DISCONTINUED | OUTPATIENT
Start: 2020-05-30 | End: 2020-06-01 | Stop reason: HOSPADM

## 2020-05-30 RX ORDER — DIPHENHYDRAMINE HYDROCHLORIDE 50 MG/ML
12.5 INJECTION, SOLUTION INTRAMUSCULAR; INTRAVENOUS
Status: DISCONTINUED | OUTPATIENT
Start: 2020-05-30 | End: 2020-06-01 | Stop reason: HOSPADM

## 2020-05-30 RX ORDER — HYDROXYCHLOROQUINE SULFATE 200 MG/1
400 TABLET, FILM COATED ORAL EVERY 12 HOURS
Status: COMPLETED | OUTPATIENT
Start: 2020-05-30 | End: 2020-05-30

## 2020-05-30 RX ORDER — ACETAMINOPHEN 650 MG/1
650 SUPPOSITORY RECTAL
Status: DISCONTINUED | OUTPATIENT
Start: 2020-05-30 | End: 2020-06-01 | Stop reason: HOSPADM

## 2020-05-30 RX ORDER — BISACODYL 5 MG
10 TABLET, DELAYED RELEASE (ENTERIC COATED) ORAL DAILY PRN
Status: DISCONTINUED | OUTPATIENT
Start: 2020-05-30 | End: 2020-06-01 | Stop reason: HOSPADM

## 2020-05-30 RX ORDER — HYDROXYCHLOROQUINE SULFATE 200 MG/1
200 TABLET, FILM COATED ORAL EVERY 12 HOURS
Status: DISCONTINUED | OUTPATIENT
Start: 2020-05-31 | End: 2020-05-31

## 2020-05-30 RX ADMIN — ENOXAPARIN SODIUM 30 MG: 30 INJECTION SUBCUTANEOUS at 20:21

## 2020-05-30 RX ADMIN — ZINC SULFATE 220 MG (50 MG) CAPSULE 1 CAPSULE: CAPSULE at 11:34

## 2020-05-30 RX ADMIN — AZITHROMYCIN 500 MG: 500 INJECTION, POWDER, LYOPHILIZED, FOR SOLUTION INTRAVENOUS at 04:56

## 2020-05-30 RX ADMIN — LORAZEPAM 1 MG: 2 INJECTION INTRAMUSCULAR; INTRAVENOUS at 04:38

## 2020-05-30 RX ADMIN — Medication 500 MG: at 11:34

## 2020-05-30 RX ADMIN — Medication 500 MG: at 22:11

## 2020-05-30 RX ADMIN — VITAMIN D, TAB 1000IU (100/BT) 2 TABLET: 25 TAB at 11:35

## 2020-05-30 RX ADMIN — HYDROXYCHLOROQUINE SULFATE 400 MG: 200 TABLET, FILM COATED ORAL at 09:07

## 2020-05-30 RX ADMIN — HYDROXYCHLOROQUINE SULFATE 400 MG: 200 TABLET, FILM COATED ORAL at 20:20

## 2020-05-30 RX ADMIN — METOPROLOL TARTRATE 12.5 MG: 25 TABLET, FILM COATED ORAL at 17:27

## 2020-05-30 RX ADMIN — WATER 2 G: 1 INJECTION INTRAMUSCULAR; INTRAVENOUS; SUBCUTANEOUS at 04:52

## 2020-05-30 RX ADMIN — Medication 500 MG: at 17:27

## 2020-05-30 RX ADMIN — Medication 5 MG: at 22:11

## 2020-05-30 RX ADMIN — HYDROCHLOROTHIAZIDE 25 MG: 25 TABLET ORAL at 17:28

## 2020-05-30 RX ADMIN — ENOXAPARIN SODIUM 30 MG: 30 INJECTION SUBCUTANEOUS at 09:08

## 2020-05-30 NOTE — ED TRIAGE NOTES
Patient arrives via EMS with c/c of difficulty breathing. Patient reports onset yesterday progressively getting worse. Upon EMS arrival patient had O2 sats at 88%, they placed her on 3L NC and patients O2 sats increased to %. No meds were given by EMS. 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 Upon arrival patients O2 sats were at 100% with 3L NC, patient still reporting difficulty breathing so non-rebreather placed on patient for comfort.

## 2020-05-30 NOTE — ROUTINE PROCESS
1915 Introduce self to Pt. Pt. Educated on call bell when in need of help and assistance. Pt. Verbalized understanding. 2030 Pt. Made no complaints. 2200  Pt. Resting comfortably. 2300 Verbal shift change  Received from Patsy Gonzalez RN (outgoing nurse), to [facility] [place] (oncoming)  Pt. Is AOX 4. IV SL , Pt. denies  pain at this time. Report included the following information SBAR, Kardex, Procedure Summary, Intake/Output, MAR, Recent Lab Result. Will resume care and monitor Pt. Condition. Pt. Educated on call bell when in need of help and assistance. Pt. verbalized understanding. 2315 Pt. Head to toe Assessment Done and documented. Pt. Given chlorhexidene bath, changed gown, assisted with de care. Changed pads. 0000  Pt. Made no complaints. 0115  Pt. Made no complaints, resting comfortably. 0315  Labs drawn, Ecg done, meds given. Pt. Made no complaints. 0415  Pt. OOB to bathroom IVP pump beeping. 0510  Pt. IVF completed. 0630  Pt made no complaints. Verbal and bedside Shift changed report given to Mariam Spatz, RN (oncoming RN) on Pt. Condition. Report consisted of patients Situation, History, Activities, intake/output,  Background, Assessment and Recommendations(SBAR). Information from the following report(s) Kardex, order Summary, Lab results and MAR was reviewed with the receiving nurse. Opportunity for questions and clarification was provided.

## 2020-05-30 NOTE — ROUTINE PROCESS
1030 
Pt arrived to  350 via stretcher. Pt was admitted with cc of respiratory distress,hypoxia, and Suspected Covid. Pt transferred to bed. Pt is awake, alert, oriented x4. Pt has O2 at 3l nc. RR 28/min. Lungs with few crackles at the bases but no wheezing noted. Pt's O2 sat 100 % at 3L nc. . Abdomen soft with hypoactive BS. No edema noted. Pt is on Droplet Plus Isolation pending result of Swab test for Covid. Cardiac Monitor applied with Box #18. Pt has NSR. 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 . Pt has INT's to RAC g20 and 20G LAC which were capped. Pt denies coughing. Dual skin check done with Shiva Carranza RN. No skin breakdown noted. Pt has no Allergies. Pt able to contact her son upon arrival to . 
 
1115 O2 sat lowered to 2L nc. O2 sat remains 100%. Pt breathing comfortably with O2 at 2L nc. 
1227 Pt denies shortness of breathe . Pt sat at edge of bed. Lunch was served. [place] [address] Ate about 50% of food. Resting in bed. Denies respiratory difficulty. [address] In to see pt. Pt is sleeping but wakes up easily to verbal stimuli. Pt breathing comfortably, not in pain or respiratory distress. Does not need anything at this time.

## 2020-05-30 NOTE — H&P
History & Physical    Patient: Tobie Hatchet MRN: [id number]  CSN: [id number]    YOB: 1950  Age: 68 y.o. Sex: female      DOA: 5/30/2020  Primary Care Provider:  Basil Kulkarni MD      Assessment/Plan   69 y/o hypertension and brain tumor admitted for acute respiratory failure with hypoxia and Suspicion of COVID-19 PNA. Acute Respiratory Failure with hypoxia -resolved  Presenting SP02 81-86% with tachypnea and diaphoresis   Improved after short period on NRB and Ativan     Suspicion of COVID-19 Pneumonia -  High suspicion to be COVID-19 Positive based on symptoms and work environment   Vitamin C, Vitamin D, Zinc and Melatonin   Lovenox 30mg q12 hours   Plaquenil 400mg BID x2, then 200mg po bid x4 days  Sputum culture   Respiratory viral panel   CXR: \"Bilateral interstitial and alveolar opacities representing any combination of  edema, atelectasis, or pneumonia. \"  SARS COV 2 Test   Daily CBC, BMP, Ferritin and EKG for monitoring of condition while on Plaquenil   Appropriate PPE during physician exam    Hypertension -  Been off home meds for 3 years, cannot afford, cannot remember what meds they were  Monitor BP  Started HCTZ and Metoprolol     Obtain HgbA1c, Lipid panel, TSH     DVT -covered by Lovenox     Patient Active Problem List   Diagnosis Code    Elevated blood pressure [id number]       Estimated length of stay : 3-4 days     CC: Respiratory Distress       HPI:     Tobie Hatchet is a 68 y.o. female WITHOUT h/o CAD, CHF, CVA, has h/o HTN and brain tumor presents to the ED with a several day h/o dry cough and feeling feverish without actual elevated temperature. EMS found her in resp distress with 02 sats 81-86%, sitting upright, very anxious, tachypnic and diaphoretic. EMS placed her on NC. In ED, she presented tachycardic 116, tachypnic with RR upper 30s-lower 40s, /138 without fever. Given both Ativan and placed on NRB with immediate substantial improvement.  ED physician relates that lung exam reveals crackles, rales and scattered rhonchi without edema or distended neck veins. Started on rocephin and azithromycin in ED. ABG on NRB: 7.346/46/173/25/99%. HR dropped from 110s to 60s, RR dropped from 40s to 20s. /77. Able to be weaned back to 3L NC. Pt works in a [employer], no known exposure to [employer] [facility] residents. Past Medical History:   Diagnosis Date    Brain tumor ([facility] [address]     Hypertension        Past Surgical History:   Procedure Laterality Date    NEUROLOGICAL PROCEDURE UNLISTED      Brain tumor       History reviewed. No pertinent family history. Social History     Socioeconomic History    Marital status: SINGLE     Spouse name: Not on file    Number of children: Not on file    Years of education: Not on file    Highest education level: Not on file   Tobacco Use    Smoking status: Never Smoker    Smokeless tobacco: Never Used   Substance and Sexual Activity    Alcohol use: No    Drug use: No       Prior to Admission medications    Not on File       No Known Allergies    Review of Systems  Gen: No fever, chills, malaise, weight loss/gain. Heent: No headache, rhinorrhea, epistaxis, ear pain, hearing loss, sinus pain, neck pain/stiffness, sore throat. Heart: No chest pain, palpitations, MICHAEL, pnd, or orthopnea. Resp: +cough, NO hemoptysis, +wheezing and shortness of breath. GI: No nausea, vomiting, diarrhea, constipation, melena or hematochezia. : No urinary obstruction, dysuria or hematuria. Derm: No rash, new skin lesion or pruritis. Musc/skeletal: no bone or joint complains. Vasc: No edema, cyanosis or claudication. Endo: No heat/cold intolerance, no polyuria,polydipsia or polyphagia. Neuro: No unilateral weakness, numbness, tingling. No seizures. Heme: No easy bruising or bleeding.           Physical Exam:     Physical Exam:  Visit Vitals  BP (!) 174/96 (BP 1 Location: Right arm, BP Patient Position: At rest)   Pulse 82   Temp 97.8 °F (36.6 °C)   Resp 28   Ht 5' 2\" (1.575 m)   Wt 53.1 kg (117 lb)   SpO2 98%   BMI 21.40 kg/m²    O2 Flow Rate (L/min): 3 l/min O2 Device: Nasal cannula    Temp (24hrs), Av.8 °F (36.6 °C), Min:97.8 °F (36.6 °C), Max:97.8 °F (36.6 °C)    No intake/output data recorded. No intake/output data recorded. General:  Awake, cooperative, no distress at the time of my exam, but previously very agitated and anxious with tachypnea and diaphoresis   Head:  Normocephalic, without obvious abnormality, atraumatic. Eyes:  Conjunctivae/corneas clear, sclera anicteric, PERRL, EOMs intact. Nose: Nares normal. No drainage or sinus tenderness. Throat: Lips, mucosa, and tongue normal.    Neck: Supple, symmetrical, trachea midline, no adenopathy. Lungs:   Coarse breath sounds with crackles, scattered rhonchi        Heart:  Regular rate and rhythm, S1, S2 normal, no murmur, click, rub or gallop. Abdomen: Soft, non-tender. Bowel sounds normal. No masses,  No organomegaly. Extremities: Extremities normal, atraumatic, no cyanosis or edema. Capillary refill normal.   Pulses: 2+ and symmetric all extremities. Skin: Skin color appropriate for ethnicity, turgor normal. No rashes or lesions   Neurologic: CNII-XII intact. No focal motor or sensory deficit.        Labs Reviewed:  Recent Results (from the past 24 hour(s))   EKG, 12 LEAD, INITIAL    Collection Time: 20  4:32 AM   Result Value Ref Range    Ventricular Rate 126 BPM    Atrial Rate 126 BPM    P-R Interval 122 ms    QRS Duration 92 ms    Q-T Interval 322 ms    QTC Calculation (Bezet) 466 ms    Calculated P Axis 67 degrees    Calculated R Axis 34 degrees    Calculated T Axis 150 degrees    Diagnosis       Sinus tachycardia with occasional premature ventricular complexes  Left atrial enlargement  Septal infarct (cited on or before 04-DEC-2019)  Abnormal ECG  When compared with ECG of 05-DEC-2019 01:03,  premature ventricular complexes are now present  Vent. rate has increased BY  61 BPM  ST now depressed in Inferior leads  T wave inversion no longer evident in Lateral leads     CBC WITH AUTOMATED DIFF    Collection Time: 05/30/20  4:43 AM   Result Value Ref Range    WBC 5.2 4.6 - 13.2 K/uL    RBC 4.69 4.20 - 5.30 M/uL    HGB 13.5 12.0 - 16.0 g/dL    HCT 42.2 35.0 - 45.0 %    MCV 90.0 74.0 - 97.0 FL    MCH 28.8 24.0 - 34.0 PG    MCHC 32.0 31.0 - 37.0 g/dL    RDW 13.9 11.6 - 14.5 %    PLATELET 417 780 - 757 K/uL    MPV 11.2 9.2 - 11.8 FL    NEUTROPHILS 71 40 - 73 %    LYMPHOCYTES 21 21 - 52 %    MONOCYTES 6 3 - 10 %    EOSINOPHILS 2 0 - 5 %    BASOPHILS 0 0 - 2 %    ABS. NEUTROPHILS 3.7 1.8 - 8.0 K/UL    ABS. LYMPHOCYTES 1.1 0.9 - 3.6 K/UL    ABS. MONOCYTES 0.3 0.05 - 1.2 K/UL    ABS. EOSINOPHILS 0.1 0.0 - 0.4 K/UL    ABS. BASOPHILS 0.0 0.0 - 0.1 K/UL    DF AUTOMATED     CARDIAC PANEL,(CK, CKMB & TROPONIN)    Collection Time: 05/30/20  4:43 AM   Result Value Ref Range    CK - MB 3.7 (H) <3.6 ng/ml    CK-MB Index 2.5 0.0 - 4.0 %     26 - 192 U/L    Troponin-I, QT 0.03 0.0 - 0.261 NG/ML   METABOLIC PANEL, COMPREHENSIVE    Collection Time: 05/30/20  4:43 AM   Result Value Ref Range    Sodium 140 136 - 145 mmol/L    Potassium 3.6 3.5 - 5.5 mmol/L    Chloride 110 100 - 111 mmol/L    CO2 22 21 - 32 mmol/L    Anion gap 8 3.0 - 18 mmol/L    Glucose 226 (H) 74 - 99 mg/dL    BUN 19 (H) 7.0 - 18 MG/DL    Creatinine 0.88 0.6 - 1.3 MG/DL    BUN/Creatinine ratio 22 (H) 12 - 20      GFR est AA >60 >60 ml/min/1.73m2    GFR est non-AA >60 >60 ml/min/1.73m2    Calcium 9.2 8.5 - 10.1 MG/DL    Bilirubin, total 0.5 0.2 - 1.0 MG/DL    ALT (SGPT) 79 (H) 13 - 56 U/L    AST (SGOT) 73 (H) 10 - 38 U/L    Alk.  phosphatase 126 (H) 45 - 117 U/L    Protein, total 7.5 6.4 - 8.2 g/dL    Albumin 3.6 3.4 - 5.0 g/dL    Globulin 3.9 2.0 - 4.0 g/dL    A-G Ratio 0.9 0.8 - 1.7     NT-PRO BNP    Collection Time: 05/30/20  4:43 AM   Result Value Ref Range    NT pro-BNP 5,062 (H) 0 - 900 PG/ML   PROTHROMBIN TIME + INR    Collection Time: 05/30/20  4:43 AM   Result Value Ref Range    Prothrombin time 13.6 11.5 - 15.2 sec    INR 1.1 0.8 - 1.2     PTT    Collection Time: 05/30/20  4:43 AM   Result Value Ref Range    aPTT <20.0 (L) 23.0 - 36.4 SEC   LACTIC ACID    Collection Time: 05/30/20  4:43 AM   Result Value Ref Range    Lactic acid 1.8 0.4 - 2.0 MMOL/L   MAGNESIUM    Collection Time: 05/30/20  4:43 AM   Result Value Ref Range    Magnesium 2.0 1.6 - 2.6 mg/dL   PHOSPHORUS    Collection Time: 05/30/20  4:43 AM   Result Value Ref Range    Phosphorus 3.1 2.5 - 4.9 MG/DL   SALICYLATE    Collection Time: 05/30/20  4:43 AM   Result Value Ref Range    Salicylate level <4.3 (L) 2.8 - 20.0 MG/DL   CULTURE, BLOOD    Collection Time: 05/30/20  4:50 AM   Result Value Ref Range    Special Requests: NO SPECIAL REQUESTS      Culture result: NO GROWTH AFTER 2 HOURS     CULTURE, BLOOD    Collection Time: 05/30/20  4:51 AM   Result Value Ref Range    Special Requests: NO SPECIAL REQUESTS      Culture result: NO GROWTH AFTER 2 HOURS     POC G3    Collection Time: 05/30/20  5:00 AM   Result Value Ref Range    Device: Non rebreather      FIO2 (POC) 100 %    pH (POC) 7.346 (L) 7.35 - 7.45      pCO2 (POC) 46.1 (H) 35.0 - 45.0 MMHG    pO2 (POC) 173 (H) 80 - 100 MMHG    HCO3 (POC) 25.2 22 - 26 MMOL/L    sO2 (POC) 99 (H) 92 - 97 %    Base excess (POC) 0 mmol/L    Allens test (POC) N/A      Total resp.  rate 36      Site RIGHT RADIAL      Specimen type (POC) ARTERIAL      Performed by Josette Smith        Procedures/imaging: see electronic medical records for all procedures/Xrays and details which were not copied into this note but were reviewed prior to creation of Plan      CC: Carlos Marcus MD

## 2020-05-30 NOTE — ED NOTES
Verbal shift change report given to Margaret Escalera RN (oncoming nurse) by Omar Shell RN (offgoing nurse). Report included the following information SBAR, ED Summary and MAR.

## 2020-05-30 NOTE — ED NOTES
Pt resting comfortably on stretcher with adequate chest rise and fall. Pt breathing regular and unlabored. No needs have been expressed at this time, will continue to monitor.

## 2020-05-30 NOTE — ED PROVIDER NOTES
EMERGENCY DEPARTMENT HISTORY AND PHYSICAL EXAM    Date: 5/30/2020  Patient Name: Lisy Patel    History of Presenting Illness     Chief Complaint   Patient presents with    Respiratory Distress         History Provided By: Patient    Additional History (Context):     4:29 AM    Lisy Patel is a 68 y.o. female with pertinent PMHx of HTN and brain tumor presenting via EMS to the ED c/o acute onset of severe SOB/dyspnea x just PTA in the ED. Pt states that she has had cough for the last 2-3 days. 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 Pt denies any history of cardiopulmonary disease. She denies any fever/chills or N/V/D.    PCP: Jarred Diaz MD  Pt does not smoke tobacco, drink EtOH excessively, or do illicit drugs. History is limited due to the pt's condition. Past History     Past Medical History:  Past Medical History:   Diagnosis Date    Brain tumor ([address]     Hypertension        Past Surgical History:  Past Surgical History:   Procedure Laterality Date    NEUROLOGICAL PROCEDURE UNLISTED      Brain tumor       Family History:  History reviewed. No pertinent family history. Social History:  Social History     Tobacco Use    Smoking status: Never Smoker    Smokeless tobacco: Never Used   Substance Use Topics    Alcohol use: No    Drug use: No       Allergies:  No Known Allergies      Review of Systems   Review of Systems   Constitutional: Negative for chills and fever. Respiratory: Positive for cough and shortness of breath. Gastrointestinal: Negative for diarrhea, nausea and vomiting. All other systems reviewed and are negative.       Physical Exam     Vitals:    05/31/20 2320 06/01/20 0350 06/01/20 0752 06/01/20 0756   BP: 146/83 146/86 (!) 169/93    Pulse: 71 73 76    Resp: 18 18 18    Temp: 97.7 °F (36.5 °C) 97.8 °F (36.6 °C) 97.8 °F (36.6 °C)    SpO2: 100% 99% 100% 100%   Weight:       Height:         Physical Exam  Vitals signs and nursing note reviewed. Constitutional:       General: She is in acute distress. Appearance: She is well-developed. She is diaphoretic. Comments: Sitting upright, acutely diaphoretic   HENT:      Head: Normocephalic and atraumatic. Right Ear: External ear normal.      Left Ear: External ear normal.      Mouth/Throat:      Mouth: Mucous membranes are dry. Pharynx: No oropharyngeal exudate. Eyes:      General: No scleral icterus. Pupils: Pupils are equal, round, and reactive to light. Comments: Mild pallor   Neck:      Musculoskeletal: Normal range of motion and neck supple. Thyroid: No thyromegaly. Vascular: No JVD. Trachea: No tracheal deviation. Cardiovascular:      Rate and Rhythm: Normal rate and regular rhythm. Heart sounds: Normal heart sounds. Pulmonary:      Effort: Tachypnea and accessory muscle usage present. No respiratory distress. Breath sounds: No stridor. Rhonchi (scattered) and rales (scattered bilaterally) present. Abdominal:      General: Bowel sounds are normal. There is no distension. Palpations: Abdomen is soft. Tenderness: There is no abdominal tenderness. There is no guarding or rebound. Musculoskeletal: Normal range of motion. General: No tenderness. Right lower leg: No edema. Left lower leg: No edema. Comments: No soft tissue injuries   Lymphadenopathy:      Cervical: No cervical adenopathy. Skin:     General: Skin is warm. Findings: No erythema or rash. Neurological:      Mental Status: She is alert and oriented to person, place, and time. Cranial Nerves: No cranial nerve deficit. Coordination: Coordination normal.      Deep Tendon Reflexes: Reflexes are normal and symmetric. Reflexes normal.   Psychiatric:         Behavior: Behavior normal.         Thought Content:  Thought content normal.         Judgment: Judgment normal.         Diagnostic Study Results     Labs -    Recent Results (from the past 12 hour(s))   EKG, 12 LEAD, INITIAL    Collection Time: 05/30/20  4:32 AM   Result Value Ref Range    Ventricular Rate 126 BPM    Atrial Rate 126 BPM    P-R Interval 122 ms    QRS Duration 92 ms    Q-T Interval 322 ms    QTC Calculation (Bezet) 466 ms    Calculated P Axis 67 degrees    Calculated R Axis 34 degrees    Calculated T Axis 150 degrees    Diagnosis       Sinus tachycardia with occasional premature ventricular complexes  Left atrial enlargement  Septal infarct (cited on or before 04-DEC-2019)  Abnormal ECG  When compared with ECG of 05-DEC-2019 01:03,  premature ventricular complexes are now present  Vent. rate has increased BY  61 BPM  ST now depressed in Inferior leads  T wave inversion no longer evident in Lateral leads     CBC WITH AUTOMATED DIFF    Collection Time: 05/30/20  4:43 AM   Result Value Ref Range    WBC 5.2 4.6 - 13.2 K/uL    RBC 4.69 4.20 - 5.30 M/uL    HGB 13.5 12.0 - 16.0 g/dL    HCT 42.2 35.0 - 45.0 %    MCV 90.0 74.0 - 97.0 FL    MCH 28.8 24.0 - 34.0 PG    MCHC 32.0 31.0 - 37.0 g/dL    RDW 13.9 11.6 - 14.5 %    PLATELET 725 925 - 527 K/uL    MPV 11.2 9.2 - 11.8 FL    NEUTROPHILS 71 40 - 73 %    LYMPHOCYTES 21 21 - 52 %    MONOCYTES 6 3 - 10 %    EOSINOPHILS 2 0 - 5 %    BASOPHILS 0 0 - 2 %    ABS. NEUTROPHILS 3.7 1.8 - 8.0 K/UL    ABS. LYMPHOCYTES 1.1 0.9 - 3.6 K/UL    ABS. MONOCYTES 0.3 0.05 - 1.2 K/UL    ABS. EOSINOPHILS 0.1 0.0 - 0.4 K/UL    ABS.  BASOPHILS 0.0 0.0 - 0.1 K/UL    DF AUTOMATED     CARDIAC PANEL,(CK, CKMB & TROPONIN)    Collection Time: 05/30/20  4:43 AM   Result Value Ref Range    CK - MB 3.7 (H) <3.6 ng/ml    CK-MB Index 2.5 0.0 - 4.0 %     26 - 192 U/L    Troponin-I, QT 0.03 0.0 - 2.724 NG/ML   METABOLIC PANEL, COMPREHENSIVE    Collection Time: 05/30/20  4:43 AM   Result Value Ref Range    Sodium 140 136 - 145 mmol/L    Potassium 3.6 3.5 - 5.5 mmol/L    Chloride 110 100 - 111 mmol/L    CO2 22 21 - 32 mmol/L    Anion gap 8 3.0 - 18 mmol/L Glucose 226 (H) 74 - 99 mg/dL    BUN 19 (H) 7.0 - 18 MG/DL    Creatinine 0.88 0.6 - 1.3 MG/DL    BUN/Creatinine ratio 22 (H) 12 - 20      GFR est AA >60 >60 ml/min/1.73m2    GFR est non-AA >60 >60 ml/min/1.73m2    Calcium 9.2 8.5 - 10.1 MG/DL    Bilirubin, total 0.5 0.2 - 1.0 MG/DL    ALT (SGPT) 79 (H) 13 - 56 U/L    AST (SGOT) 73 (H) 10 - 38 U/L    Alk. phosphatase 126 (H) 45 - 117 U/L    Protein, total 7.5 6.4 - 8.2 g/dL    Albumin 3.6 3.4 - 5.0 g/dL    Globulin 3.9 2.0 - 4.0 g/dL    A-G Ratio 0.9 0.8 - 1.7     NT-PRO BNP    Collection Time: 05/30/20  4:43 AM   Result Value Ref Range    NT pro-BNP 5,062 (H) 0 - 900 PG/ML   PROTHROMBIN TIME + INR    Collection Time: 05/30/20  4:43 AM   Result Value Ref Range    Prothrombin time 13.6 11.5 - 15.2 sec    INR 1.1 0.8 - 1.2     PTT    Collection Time: 05/30/20  4:43 AM   Result Value Ref Range    aPTT <20.0 (L) 23.0 - 36.4 SEC   LACTIC ACID    Collection Time: 05/30/20  4:43 AM   Result Value Ref Range    Lactic acid 1.8 0.4 - 2.0 MMOL/L   MAGNESIUM    Collection Time: 05/30/20  4:43 AM   Result Value Ref Range    Magnesium 2.0 1.6 - 2.6 mg/dL   PHOSPHORUS    Collection Time: 05/30/20  4:43 AM   Result Value Ref Range    Phosphorus 3.1 2.5 - 4.9 MG/DL   SALICYLATE    Collection Time: 05/30/20  4:43 AM   Result Value Ref Range    Salicylate level <4.9 (L) 2.8 - 20.0 MG/DL   POC G3    Collection Time: 05/30/20  5:00 AM   Result Value Ref Range    Device: Non rebreather      FIO2 (POC) 100 %    pH (POC) 7.346 (L) 7.35 - 7.45      pCO2 (POC) 46.1 (H) 35.0 - 45.0 MMHG    pO2 (POC) 173 (H) 80 - 100 MMHG    HCO3 (POC) 25.2 22 - 26 MMOL/L    sO2 (POC) 99 (H) 92 - 97 %    Base excess (POC) 0 mmol/L    Allens test (POC) N/A      Total resp.  rate 36      Site RIGHT RADIAL      Specimen type (POC) ARTERIAL      Performed by Sabra Ham        Radiologic Studies -   XR CHEST PORT   Final Result   IMPRESSION:      Bilateral interstitial and alveolar opacities representing any combination of   edema, atelectasis, or pneumonia. 6:13 AM  RADIOLOGY FINDINGS  Chest X-ray shows bilateral opacities to bilateral bases, with a single lesion extending to the right apex. Small sclerotic ring to the aortic nob  Pending review by Radiologist  Recorded by Jeremie Ashton, as dictated by Abdirahman Shaffer. Rafael Flannery MD.       Medical Decision Making   I am the first provider for this patient. I reviewed the vital signs, available nursing notes, past medical history, past surgical history, family history and social history. Vital Signs-Reviewed the patient's vital signs. Pulse Oximetry Analysis - 100% on RA     Cardiac Monitor:  Rate: 115 bpm  Rhythm: sinus tachycardia    EKG interpretation: (Preliminary)  Sinus tachycardia at 126 bpm. Positive LAE with biphasic P-waves. Q-wave in V1, V2, and V3. QTc is 466 ms. EKG read by Abdirahman Shaffer. Rafael Flannery MD at 4:32 AM     Records Reviewed: Nursing Notes and Old Medical Records    Provider Notes (Medical Decision Making): Will avoid aggressive fluid resuscitation due to suspected COVID-19. Since she is hypertensive, would avoid aggressive fluids due to potential pulmonary edema. PROCEDURES:  Procedures    MEDICATIONS GIVEN IN THE ED:  Medications   LORazepam (ATIVAN) injection 1 mg (1 mg IntraVENous Given 5/30/20 0438)   hydroxychloroquine (PLAQUENIL) tablet 400 mg (400 mg Oral Given 5/30/20 2020)        ED COURSE:   4:29 AM   Initial assessment performed. CONSULT NOTE:   6:57 AM  Abdirahman Shaffer. Rafael Flannery MD spoke with Dr. Sky Thorpe,   Specialty: Hospitalist  Discussed pt's hx, disposition, and available diagnostic and imaging results. Reviewed care plans. Consultant will admit the pt to medical service, telemetry. Written by Juan Vásquez, ED Scribe, as dictated by Jaleel Flannery MD.       Diagnosis and Disposition     CRITICAL CARE NOTE:  7:52 PM  I have spent 45 minutes of critical care time involved in lab review, consultations with specialist, family decision-making, and documentation. During this entire length of time I was immediately available to the patient. Critical Care: The reason for providing this level of medical care for this critically ill patient was due a critical illness that impaired one or more vital organ systems such that there was a high probability of imminent or life threatening deterioration in the patients condition. This care involved high complexity decision making to assess, manipulate, and support vital system functions, to treat this degreee vital organ system failure and to prevent further life threatening deterioration of the patients condition. Core Measures:  For Hospitalized Patients:    1. Hospitalization Decision Time:  The decision to hospitalize the patient was made by Kendra Alcantar MD at 6:57 AM on 5/30/2020    2. Aspirin: Aspirin was not given because the patient did not present with a stroke at the time of their Emergency Department evaluation     Patient is being admitted to the hospital by Dr. Clarissa Padilla. The results of their tests and reasons for their admission have been discussed with them and/or available family. They convey agreement and understanding for the need to be admitted and for their admission diagnosis. CONDITIONS ON ADMISSION:  Sepsis is not present at the time of admission. Deep Vein Thrombosis is not present at the time of admission. Thrombosis is not present at the time of admission. Urinary Tract Infection is not present at the time of admission. Pneumonia is present at the time of admission. MRSA is not present at the time of admission. Wound infection is not present at the time of admission. Pressure Ulcer is not present at the time of admission. CLINICAL IMPRESSION:  1. Acute respiratory failure with hypoxia ([address]    2. Suspected COVID-19 virus infection    3. Hypoxia        PLAN:  1. ADMIT TO telmetry    _______________________________    Attestations:   This note is prepared by Lester Sanders, acting as Scribe for Kim. Everardo Brown MD.    [employer]. Everardo Brown MD:  The scribe's documentation has been prepared under my direction and personally reviewed by me in its entirety.  I confirm that the note above accurately reflects all work, treatment, procedures, and medical decision making performed by me.  _______________________________

## 2020-05-30 NOTE — ROUTINE PROCESS
TRANSFER - IN REPORT: 
 
Verbal report received from [employer], RN(name) on Leana Bowling  being received from ED(unit) for routine progression of care Report consisted of patients Situation, Background, Assessment and  
Recommendations(SBAR). Information from the following report(s) SBAR, Kardex and MAR was reviewed with the receiving nurse. Opportunity for questions and clarification was provided. Assessment completed upon patients arrival to unit and care assumed.

## 2020-05-30 NOTE — ED NOTES
Pt resting on stretcher did speak to son and is resting comfortably at this time. Pt updated on plan of care at this time.

## 2020-05-30 NOTE — ROUTINE PROCESS
1541 
Bedside and Verbal shift change report given to Elvira High RN (oncoming nurse) by Sabrina Parker RN (offgoing nurse). Report included the following information SBAR, Kardex and MAR.

## 2020-05-30 NOTE — PROGRESS NOTES
Problem: Breathing Pattern - Ineffective  Goal: *Absence of hypoxia  Outcome: Progressing Towards Goal     Problem: Breathing Pattern - Ineffective  Goal: *Use of effective breathing techniques  Outcome: Progressing Towards Goal

## 2020-05-30 NOTE — ROUTINE PROCESS
TRANSFER - OUT REPORT: 
 
Verbal report given to Dominique Valerio RN on Leana Bowling  being transferred to Telemetry(unit) for routine progression of care Report consisted of patients Situation, Background, Assessment and  
Recommendations(SBAR). Information from the following report(s) ED Summary was reviewed with the receiving nurse. Lines:  
Peripheral IV 05/30/20 Left Antecubital (Active) Site Assessment Clean, dry, & intact 5/30/2020  4:43 AM  
Phlebitis Assessment 0 5/30/2020  4:43 AM  
Infiltration Assessment 0 5/30/2020  4:43 AM  
Dressing Status Clean, dry, & intact 5/30/2020  4:43 AM  
Hub Color/Line Status Pink;Flushed;Patent 5/30/2020  4:43 AM  
Action Taken Blood drawn 5/30/2020  4:43 AM  
   
Peripheral IV 05/30/20 Right Antecubital (Active) Site Assessment Clean, dry, & intact 5/30/2020  4:50 AM  
Phlebitis Assessment 0 5/30/2020  4:50 AM  
Infiltration Assessment 0 5/30/2020  4:50 AM  
Dressing Status Clean, dry, & intact 5/30/2020  4:50 AM  
Dressing Type Transparent 5/30/2020  4:50 AM  
Hub Color/Line Status Pink;Flushed;Patent 5/30/2020  4:50 AM  
Action Taken Blood drawn 5/30/2020  4:50 AM  
  
 
Opportunity for questions and clarification was provided. Patient transported with: 
 O2 @ 3 liters RN Monitor

## 2020-05-31 PROBLEM — N39.0 UTI (URINARY TRACT INFECTION): Status: ACTIVE | Noted: 2020-05-31

## 2020-05-31 PROBLEM — I10 HYPERTENSION, UNCONTROLLED: Status: ACTIVE | Noted: 2020-05-31

## 2020-05-31 LAB
ALBUMIN SERPL-MCNC: 3.1 G/DL (ref 3.4–5)
ALBUMIN/GLOB SERPL: 0.9 {RATIO} (ref 0.8–1.7)
ALP SERPL-CCNC: 111 U/L (ref 45–117)
ALT SERPL-CCNC: 58 U/L (ref 13–56)
ANION GAP SERPL CALC-SCNC: 5 MMOL/L (ref 3–18)
APTT PPP: 27.3 SEC (ref 23–36.4)
AST SERPL-CCNC: 36 U/L (ref 10–38)
BASOPHILS # BLD: 0 K/UL (ref 0–0.1)
BASOPHILS NFR BLD: 0 % (ref 0–2)
BILIRUB SERPL-MCNC: 0.4 MG/DL (ref 0.2–1)
BUN SERPL-MCNC: 16 MG/DL (ref 7–18)
BUN/CREAT SERPL: 22 (ref 12–20)
CALCIUM SERPL-MCNC: 8.7 MG/DL (ref 8.5–10.1)
CHLORIDE SERPL-SCNC: 108 MMOL/L (ref 100–111)
CHOLEST SERPL-MCNC: 163 MG/DL
CO2 SERPL-SCNC: 27 MMOL/L (ref 21–32)
CREAT SERPL-MCNC: 0.73 MG/DL (ref 0.6–1.3)
D DIMER PPP FEU-MCNC: 0.4 UG/ML(FEU)
DIFFERENTIAL METHOD BLD: ABNORMAL
EOSINOPHIL # BLD: 0.1 K/UL (ref 0–0.4)
EOSINOPHIL NFR BLD: 2 % (ref 0–5)
ERYTHROCYTE [DISTWIDTH] IN BLOOD BY AUTOMATED COUNT: 13.9 % (ref 11.6–14.5)
EST. AVERAGE GLUCOSE BLD GHB EST-MCNC: 123 MG/DL
FIBRINOGEN PPP-MCNC: 275 MG/DL (ref 210–451)
GLOBULIN SER CALC-MCNC: 3.4 G/DL (ref 2–4)
GLUCOSE SERPL-MCNC: 99 MG/DL (ref 74–99)
HBA1C MFR BLD: 5.9 % (ref 4.2–5.6)
HCT VFR BLD AUTO: 41.8 % (ref 35–45)
HDLC SERPL-MCNC: 68 MG/DL (ref 40–60)
HDLC SERPL: 2.4 {RATIO} (ref 0–5)
HGB BLD-MCNC: 13.3 G/DL (ref 12–16)
INR PPP: 1.2 (ref 0.8–1.2)
LDLC SERPL CALC-MCNC: 82.2 MG/DL (ref 0–100)
LIPID PROFILE,FLP: ABNORMAL
LYMPHOCYTES # BLD: 1.5 K/UL (ref 0.9–3.6)
LYMPHOCYTES NFR BLD: 27 % (ref 21–52)
MCH RBC QN AUTO: 28.6 PG (ref 24–34)
MCHC RBC AUTO-ENTMCNC: 31.8 G/DL (ref 31–37)
MCV RBC AUTO: 89.9 FL (ref 74–97)
MONOCYTES # BLD: 0.7 K/UL (ref 0.05–1.2)
MONOCYTES NFR BLD: 12 % (ref 3–10)
NEUTS SEG # BLD: 3.2 K/UL (ref 1.8–8)
NEUTS SEG NFR BLD: 59 % (ref 40–73)
PLATELET # BLD AUTO: 199 K/UL (ref 135–420)
PMV BLD AUTO: 11.6 FL (ref 9.2–11.8)
POTASSIUM SERPL-SCNC: 4.1 MMOL/L (ref 3.5–5.5)
PROT SERPL-MCNC: 6.5 G/DL (ref 6.4–8.2)
PROTHROMBIN TIME: 14.7 SEC (ref 11.5–15.2)
RBC # BLD AUTO: 4.65 M/UL (ref 4.2–5.3)
SODIUM SERPL-SCNC: 140 MMOL/L (ref 136–145)
TRIGL SERPL-MCNC: 64 MG/DL (ref ?–150)
TSH SERPL DL<=0.05 MIU/L-ACNC: 0.59 UIU/ML (ref 0.36–3.74)
VLDLC SERPL CALC-MCNC: 12.8 MG/DL
WBC # BLD AUTO: 5.5 K/UL (ref 4.6–13.2)

## 2020-05-31 PROCEDURE — 65660000000 HC RM CCU STEPDOWN

## 2020-05-31 PROCEDURE — 83036 HEMOGLOBIN GLYCOSYLATED A1C: CPT

## 2020-05-31 PROCEDURE — 74011250637 HC RX REV CODE- 250/637: Performed by: FAMILY MEDICINE

## 2020-05-31 PROCEDURE — 77010033678 HC OXYGEN DAILY

## 2020-05-31 PROCEDURE — 93005 ELECTROCARDIOGRAM TRACING: CPT

## 2020-05-31 PROCEDURE — 84443 ASSAY THYROID STIM HORMONE: CPT

## 2020-05-31 PROCEDURE — 74011000250 HC RX REV CODE- 250: Performed by: EMERGENCY MEDICINE

## 2020-05-31 PROCEDURE — 85379 FIBRIN DEGRADATION QUANT: CPT

## 2020-05-31 PROCEDURE — 85610 PROTHROMBIN TIME: CPT

## 2020-05-31 PROCEDURE — 85730 THROMBOPLASTIN TIME PARTIAL: CPT

## 2020-05-31 PROCEDURE — 85384 FIBRINOGEN ACTIVITY: CPT

## 2020-05-31 PROCEDURE — 85025 COMPLETE CBC W/AUTO DIFF WBC: CPT

## 2020-05-31 PROCEDURE — 74011250636 HC RX REV CODE- 250/636: Performed by: EMERGENCY MEDICINE

## 2020-05-31 PROCEDURE — 80061 LIPID PANEL: CPT

## 2020-05-31 PROCEDURE — 74011250636 HC RX REV CODE- 250/636: Performed by: FAMILY MEDICINE

## 2020-05-31 PROCEDURE — 80053 COMPREHEN METABOLIC PANEL: CPT

## 2020-05-31 RX ADMIN — ENOXAPARIN SODIUM 30 MG: 30 INJECTION SUBCUTANEOUS at 20:03

## 2020-05-31 RX ADMIN — Medication 500 MG: at 22:38

## 2020-05-31 RX ADMIN — VITAMIN D, TAB 1000IU (100/BT) 2 TABLET: 25 TAB at 08:44

## 2020-05-31 RX ADMIN — Medication 500 MG: at 17:12

## 2020-05-31 RX ADMIN — Medication 500 MG: at 13:25

## 2020-05-31 RX ADMIN — WATER 2 G: 1 INJECTION INTRAMUSCULAR; INTRAVENOUS; SUBCUTANEOUS at 04:41

## 2020-05-31 RX ADMIN — Medication 500 MG: at 04:17

## 2020-05-31 RX ADMIN — METOPROLOL TARTRATE 12.5 MG: 25 TABLET, FILM COATED ORAL at 08:44

## 2020-05-31 RX ADMIN — AZITHROMYCIN 500 MG: 500 INJECTION, POWDER, LYOPHILIZED, FOR SOLUTION INTRAVENOUS at 03:46

## 2020-05-31 RX ADMIN — HYDROCHLOROTHIAZIDE 25 MG: 25 TABLET ORAL at 08:44

## 2020-05-31 RX ADMIN — ENOXAPARIN SODIUM 30 MG: 30 INJECTION SUBCUTANEOUS at 08:44

## 2020-05-31 RX ADMIN — ZINC SULFATE 220 MG (50 MG) CAPSULE 1 CAPSULE: CAPSULE at 08:44

## 2020-05-31 RX ADMIN — METOPROLOL TARTRATE 12.5 MG: 25 TABLET, FILM COATED ORAL at 20:03

## 2020-05-31 RX ADMIN — Medication 5 MG: at 22:37

## 2020-05-31 RX ADMIN — HYDROXYCHLOROQUINE SULFATE 200 MG: 200 TABLET, FILM COATED ORAL at 08:44

## 2020-05-31 NOTE — PROGRESS NOTES
Problem: Breathing Pattern - Ineffective  Goal: *Absence of hypoxia  Outcome: Progressing Towards Goal  Goal: *Use of effective breathing techniques  Outcome: Progressing Towards Goal     Problem: Patient Education: Go to Patient Education Activity  Goal: Patient/Family Education  Outcome: Progressing Towards Goal     Problem: Falls - Risk of  Goal: *Absence of Falls  Description: Document Nimo Aguilar Fall Risk and appropriate interventions in the flowsheet.   Outcome: Progressing Towards Goal  Note: Fall Risk Interventions:            Medication Interventions: Evaluate medications/consider consulting pharmacy    Elimination Interventions: Patient to call for help with toileting needs

## 2020-05-31 NOTE — PROGRESS NOTES
Hospitalist Progress Note    Patient: Ike Barry MRN: [id number]  CSN: [id number]    YOB: 1950  Age: 68 y.o. Sex: female    DOA: 5/30/2020 LOS:  LOS: 1 day          Chief Complaint:    SOB    Assessment/Plan     67 y/o hypertension and brain tumor admitted for acute respiratory failure with hypoxia and Suspicion of COVID-19 PNA.     Acute Respiratory Failure with hypoxia -resolved    DC Plaquenil because QT interval increased to 448, efficacy less supported and pt asymptomatic, recheck EKG in AM     Repeat CXR     UTI -  Covered by Rocephin   Urine cultures pending    Suspicion of COVID-19 Pneumonia -  High suspicion to be COVID-19 Positive based on symptoms and work environment   Vitamin C, Vitamin D, Zinc and Melatonin   Lovenox 30mg q12 hours   Sputum culture   Respiratory viral panel negative   CXR: \"Bilateral interstitial and alveolar opacities representing any combination of  edema, atelectasis, or pneumonia. \"  Continue Rocephin and Azithromycin   SARS COV 2 Test pending   Daily CBC, BMP, and Ferritin   Appropriate PPE during physician exam     Hypertension -  Been off home meds for 3 years, cannot afford, cannot remember what meds they were  Monitor BP  HCTZ and Metoprolol      DVT -covered by Lovenox     Disposition :  Patient Active Problem List   Diagnosis Code    Elevated blood pressure [id number]    [facility] ([facility] J96.00    Hypoxia R09.02    Suspected COVID-19 virus infection Z20.828       Subjective:    Had a good night. No complaints.      No c/o chest pain, SOB, MICHAEL, loss of taste/smell    Review of systems:    Constitutional: denies fevers, chills, myalgias  Respiratory: denies SOB, cough  Cardiovascular: denies chest pain, palpitations  Gastrointestinal: denies nausea, vomiting, diarrhea      Vital signs/Intake and Output:  Visit Vitals  BP (!) 145/96 (BP 1 Location: Right arm)   Pulse 84   Temp 97.2 °F (36.2 °C)   Resp 18   Ht 5' 2\" (1.575 m)   Wt 53.1 kg (117 lb)   SpO2 99%   BMI 21.40 kg/m²     Current Shift:  No intake/output data recorded. Last three shifts:  05/29 1901 - 05/31 0700  In: 720 [P.O.:720]  Out: 525 [Urine:525]    Exam:    General: Well developed, alert, NAD, OX3  Head/Neck: NCAT, supple, No masses, No lymphadenopathy  CVS:Regular rate and rhythm, no M/R/G, S1/S2 heard, no thrill  Lungs:Clear to auscultation bilaterally, no wheezes, rhonchi, or rales  Abdomen: Soft, Nontender, No distention, Normal Bowel sounds, No hepatomegaly  Extremities: No C/C/E, pulses palpable 2+  Skin:normal texture and turgor, no rashes, no lesions  Neuro:grossly normal , follows commands  Psych:appropriate                Labs: Results:       Chemistry Recent Labs     05/31/20 0345 05/30/20  0443   GLU 99 226*    140   K 4.1 3.6    110   CO2 27 22   BUN 16 19*   CREA 0.73 0.88   CA 8.7 9.2   AGAP 5 8   BUCR 22* 22*    126*   TP 6.5 7.5   ALB 3.1* 3.6   GLOB 3.4 3.9   AGRAT 0.9 0.9      CBC w/Diff Recent Labs     05/31/20 0345 05/30/20  0443   WBC 5.5 5.2   RBC 4.65 4.69   HGB 13.3 13.5   HCT 41.8 42.2    201   GRANS 59 71   LYMPH 27 21   EOS 2 2      Cardiac Enzymes Recent Labs     05/30/20  0443      CKND1 2.5      Coagulation Recent Labs     05/31/20 0345 05/30/20  0443   PTP 14.7 13.6   INR 1.2 1.1   APTT 27.3 <20.0*       Lipid Panel No results found for: CHOL, CHOLPOCT, CHOLX, CHLST, CHOLV, 186447, HDL, HDLP, LDL, LDLC, DLDLP, 706980, VLDLC, VLDL, TGLX, TRIGL, TRIGP, TGLPOCT, CHHD, CHHDX   BNP No results for input(s): BNPP in the last 72 hours.    Liver Enzymes Recent Labs     05/31/20  0345   TP 6.5   ALB 3.1*         Thyroid Studies Lab Results   Component Value Date/Time    TSH 0.59 05/31/2020 03:45 AM        Procedures/imaging: see electronic medical records for all procedures/Xrays and details which were not copied into this note but were reviewed prior to creation of Ayleen López MD

## 2020-05-31 NOTE — ROUTINE PROCESS
Bedside and Verbal shift change report given to Rona Cerda (oncoming nurse) by Pascale Ellis (offgoing nurse). Report included the following information SBAR, Kardex and MAR.

## 2020-05-31 NOTE — PROGRESS NOTES
0800 Assumed care of patient. Patient alert oriented and resting in bed. .. Patient denies pain and answers all question appropriately. 1230 Reassessment of patient. . Patient at the side of bed watching television. Dorina Morales denies pain. .. oriented and appropriate. .. no additional concerns at this time  1700 Reassessment of patient. .. Patient pleasant, oriented x4 and eating at the bedside. ... No additional considerations. ... Will continue to monior  1900  Bedside and Verbal shift change report given to [id number] Jeromy Leong (oncoming nurse) by Gladys Acuna RN (offgoing nurse). Report included the following information SBAR and Kardex.

## 2020-05-31 NOTE — PROGRESS NOTES
Chart reviewed as CM on call. Pt admitted to tele by hospitalist for pneumonia and possible covid19. Called into pt room to discuss dc plan, CM role explained. Pt states her nurse is at bedside and requesting a call another time. Noted no ACP on file, spiritual care consult placed. CM will follow for transition of care needs and will be available via hospital  on weekends. Reason for Admission:   Per H&P pt is \"is a 68 y.o. female WITHOUT h/o CAD, CHF, CVA, has h/o HTN and brain tumor presents to the ED with a several day h/o dry cough and feeling feverish without actual elevated temperature. EMS found her in resp distress with 02 sats 81-86%, sitting upright, very anxious, tachypnic and diaphoretic. EMS placed her on NC. In ED, she presented tachycardic 116, tachypnic with RR upper 30s-lower 40s, /138 without fever. Given both Ativan and placed on NRB with immediate substantial improvement. ED physician relates that lung exam reveals crackles, rales and scattered rhonchi without edema or distended neck veins. Started on rocephin and azithromycin in ED. ABG on NRB: 7.346/46/173/25/99%. HR dropped from 110s to 60s, RR dropped from 40s to 20s. /77. Able to be weaned back to 3L NC.      Pt works in a [employer], no known exposure to [facility] positive residents.  \"                   RUR Score:         LOW            Plan for utilizing home health:      TBD    PCP: First and Last name:  Listed as MD Lowe   Name of Practice:    Are you a current patient: Yes/No:    Approximate date of last visit:    Can you participate in a virtual visit with your PCP:                     Current Advanced Directive/Advance Care Plan: Not on file, spiritual care consult placed                         Transition of Care Plan:     TBD                 Care Management Interventions  Transition of Care Consult (CM Consult): Discharge Planning

## 2020-06-01 VITALS
HEART RATE: 76 BPM | RESPIRATION RATE: 18 BRPM | HEIGHT: 62 IN | SYSTOLIC BLOOD PRESSURE: 169 MMHG | TEMPERATURE: 97.8 F | DIASTOLIC BLOOD PRESSURE: 93 MMHG | WEIGHT: 117 LBS | BODY MASS INDEX: 21.53 KG/M2 | OXYGEN SATURATION: 100 %

## 2020-06-01 PROBLEM — R09.02 HYPOXIA: Status: RESOLVED | Noted: 2020-05-30 | Resolved: 2020-06-01

## 2020-06-01 PROBLEM — J96.00 ACUTE RESPIRATORY FAILURE (HCC): Status: RESOLVED | Noted: 2020-05-30 | Resolved: 2020-06-01

## 2020-06-01 PROBLEM — J18.9 PNA (PNEUMONIA): Status: ACTIVE | Noted: 2020-06-01

## 2020-06-01 LAB
ALBUMIN SERPL-MCNC: 2.9 G/DL (ref 3.4–5)
ALBUMIN/GLOB SERPL: 0.9 {RATIO} (ref 0.8–1.7)
ALP SERPL-CCNC: 105 U/L (ref 45–117)
ALT SERPL-CCNC: 47 U/L (ref 13–56)
ANION GAP SERPL CALC-SCNC: 5 MMOL/L (ref 3–18)
APTT PPP: 26 SEC (ref 23–36.4)
AST SERPL-CCNC: 25 U/L (ref 10–38)
ATRIAL RATE: 77 BPM
ATRIAL RATE: 81 BPM
BACTERIA SPEC CULT: NORMAL
BASOPHILS # BLD: 0 K/UL (ref 0–0.1)
BASOPHILS NFR BLD: 1 % (ref 0–2)
BILIRUB SERPL-MCNC: 0.4 MG/DL (ref 0.2–1)
BUN SERPL-MCNC: 17 MG/DL (ref 7–18)
BUN/CREAT SERPL: 20 (ref 12–20)
CALCIUM SERPL-MCNC: 8.8 MG/DL (ref 8.5–10.1)
CALCULATED P AXIS, ECG09: 40 DEGREES
CALCULATED P AXIS, ECG09: 49 DEGREES
CALCULATED R AXIS, ECG10: 33 DEGREES
CALCULATED R AXIS, ECG10: 43 DEGREES
CALCULATED T AXIS, ECG11: -61 DEGREES
CALCULATED T AXIS, ECG11: -73 DEGREES
CHLORIDE SERPL-SCNC: 109 MMOL/L (ref 100–111)
CO2 SERPL-SCNC: 26 MMOL/L (ref 21–32)
CREAT SERPL-MCNC: 0.87 MG/DL (ref 0.6–1.3)
D DIMER PPP FEU-MCNC: 0.3 UG/ML(FEU)
DIAGNOSIS, 93000: NORMAL
DIAGNOSIS, 93000: NORMAL
DIFFERENTIAL METHOD BLD: ABNORMAL
EOSINOPHIL # BLD: 0.1 K/UL (ref 0–0.4)
EOSINOPHIL NFR BLD: 2 % (ref 0–5)
ERYTHROCYTE [DISTWIDTH] IN BLOOD BY AUTOMATED COUNT: 13.7 % (ref 11.6–14.5)
FIBRINOGEN PPP-MCNC: 291 MG/DL (ref 210–451)
GLOBULIN SER CALC-MCNC: 3.4 G/DL (ref 2–4)
GLUCOSE SERPL-MCNC: 99 MG/DL (ref 74–99)
HCT VFR BLD AUTO: 42.1 % (ref 35–45)
HGB BLD-MCNC: 13.5 G/DL (ref 12–16)
INR PPP: 1.1 (ref 0.8–1.2)
LYMPHOCYTES # BLD: 1.6 K/UL (ref 0.9–3.6)
LYMPHOCYTES NFR BLD: 31 % (ref 21–52)
MCH RBC QN AUTO: 28.9 PG (ref 24–34)
MCHC RBC AUTO-ENTMCNC: 32.1 G/DL (ref 31–37)
MCV RBC AUTO: 90.1 FL (ref 74–97)
MONOCYTES # BLD: 0.5 K/UL (ref 0.05–1.2)
MONOCYTES NFR BLD: 11 % (ref 3–10)
NEUTS SEG # BLD: 2.8 K/UL (ref 1.8–8)
NEUTS SEG NFR BLD: 55 % (ref 40–73)
P-R INTERVAL, ECG05: 132 MS
P-R INTERVAL, ECG05: 140 MS
PLATELET # BLD AUTO: 198 K/UL (ref 135–420)
PMV BLD AUTO: 12 FL (ref 9.2–11.8)
POTASSIUM SERPL-SCNC: 3.7 MMOL/L (ref 3.5–5.5)
PROT SERPL-MCNC: 6.3 G/DL (ref 6.4–8.2)
PROTHROMBIN TIME: 14 SEC (ref 11.5–15.2)
Q-T INTERVAL, ECG07: 448 MS
Q-T INTERVAL, ECG07: 476 MS
QRS DURATION, ECG06: 100 MS
QRS DURATION, ECG06: 108 MS
QTC CALCULATION (BEZET), ECG08: 520 MS
QTC CALCULATION (BEZET), ECG08: 538 MS
RBC # BLD AUTO: 4.67 M/UL (ref 4.2–5.3)
SARS-COV-2, COV2NT: NOT DETECTED
SERVICE CMNT-IMP: NORMAL
SODIUM SERPL-SCNC: 140 MMOL/L (ref 136–145)
VENTRICULAR RATE, ECG03: 77 BPM
VENTRICULAR RATE, ECG03: 81 BPM
WBC # BLD AUTO: 5 K/UL (ref 4.6–13.2)

## 2020-06-01 PROCEDURE — 74011000250 HC RX REV CODE- 250: Performed by: EMERGENCY MEDICINE

## 2020-06-01 PROCEDURE — 80053 COMPREHEN METABOLIC PANEL: CPT

## 2020-06-01 PROCEDURE — 93005 ELECTROCARDIOGRAM TRACING: CPT

## 2020-06-01 PROCEDURE — 85025 COMPLETE CBC W/AUTO DIFF WBC: CPT

## 2020-06-01 PROCEDURE — 36415 COLL VENOUS BLD VENIPUNCTURE: CPT

## 2020-06-01 PROCEDURE — 74011250636 HC RX REV CODE- 250/636: Performed by: FAMILY MEDICINE

## 2020-06-01 PROCEDURE — 85610 PROTHROMBIN TIME: CPT

## 2020-06-01 PROCEDURE — 74011250637 HC RX REV CODE- 250/637: Performed by: FAMILY MEDICINE

## 2020-06-01 PROCEDURE — 85730 THROMBOPLASTIN TIME PARTIAL: CPT

## 2020-06-01 PROCEDURE — 85379 FIBRIN DEGRADATION QUANT: CPT

## 2020-06-01 PROCEDURE — 85384 FIBRINOGEN ACTIVITY: CPT

## 2020-06-01 PROCEDURE — 74011250636 HC RX REV CODE- 250/636: Performed by: EMERGENCY MEDICINE

## 2020-06-01 RX ORDER — HYDROCHLOROTHIAZIDE 25 MG/1
25 TABLET ORAL DAILY
Qty: 30 TAB | Refills: 0 | OUTPATIENT
Start: 2020-06-02 | End: 2021-05-29

## 2020-06-01 RX ORDER — AZITHROMYCIN 250 MG/1
250 TABLET, FILM COATED ORAL DAILY
Qty: 3 TAB | Refills: 0 | Status: SHIPPED | OUTPATIENT
Start: 2020-06-01

## 2020-06-01 RX ORDER — AMOXICILLIN AND CLAVULANATE POTASSIUM 875; 125 MG/1; MG/1
1 TABLET, FILM COATED ORAL 2 TIMES DAILY
Qty: 10 TAB | Refills: 0 | Status: SHIPPED | OUTPATIENT
Start: 2020-06-01 | End: 2020-06-06

## 2020-06-01 RX ORDER — SAME BUTANEDISULFONATE/BETAINE 400-600 MG
500 POWDER IN PACKET (EA) ORAL 2 TIMES DAILY
Qty: 28 CAP | Refills: 0 | Status: SHIPPED | OUTPATIENT
Start: 2020-06-01 | End: 2020-06-08

## 2020-06-01 RX ORDER — METOPROLOL TARTRATE 25 MG/1
12.5 TABLET, FILM COATED ORAL EVERY 12 HOURS
Qty: 60 TAB | Refills: 0 | OUTPATIENT
Start: 2020-06-01 | End: 2021-05-29

## 2020-06-01 RX ADMIN — Medication 500 MG: at 04:16

## 2020-06-01 RX ADMIN — WATER 2 G: 1 INJECTION INTRAMUSCULAR; INTRAVENOUS; SUBCUTANEOUS at 04:14

## 2020-06-01 RX ADMIN — ENOXAPARIN SODIUM 30 MG: 30 INJECTION SUBCUTANEOUS at 08:02

## 2020-06-01 RX ADMIN — AZITHROMYCIN 500 MG: 500 INJECTION, POWDER, LYOPHILIZED, FOR SOLUTION INTRAVENOUS at 04:14

## 2020-06-01 RX ADMIN — ZINC SULFATE 220 MG (50 MG) CAPSULE 1 CAPSULE: CAPSULE at 08:01

## 2020-06-01 RX ADMIN — METOPROLOL TARTRATE 12.5 MG: 25 TABLET, FILM COATED ORAL at 08:02

## 2020-06-01 RX ADMIN — VITAMIN D, TAB 1000IU (100/BT) 2 TABLET: 25 TAB at 08:02

## 2020-06-01 RX ADMIN — HYDROCHLOROTHIAZIDE 25 MG: 25 TABLET ORAL at 08:02

## 2020-06-01 NOTE — DISCHARGE SUMMARY
Discharge Summary    Patient: Lino Carrillo MRN: [id number]  CSN: [id number]    YOB: 1950  Age: 68 y.o. Sex: female    DOA: 5/30/2020 LOS:  LOS: 2 days   Discharge Date:      Primary Care Provider:  None    Admission Diagnoses: Acute respiratory failure ([facility] [address] [J96.00]  Hypoxia [R09.02]  Suspected COVID-19 virus infection [Z20.828]  PNA (pneumonia) [J18.9]  PNA (pneumonia) [J18.9]    Discharge Diagnoses:    Hospital Problems  Never Reviewed          Codes Class Noted POA    PNA (pneumonia) ICD-10-CM: J18.9  ICD-9-CM: 486  6/1/2020 Unknown        Hypertension, uncontrolled ICD-10-CM: I10  ICD-9-CM: 401.9  5/31/2020 Unknown        UTI (urinary tract infection) ICD-10-CM: N39.0  ICD-9-CM: 599.0  5/31/2020 Unknown              Discharge Condition: stable     Discharge Medications:     Current Discharge Medication List      START taking these medications    Details   hydroCHLOROthiazide (HYDRODIURIL) 25 mg tablet Take 1 Tab by mouth daily. Qty: 30 Tab, Refills: 0      metoprolol tartrate (LOPRESSOR) 25 mg tablet Take 0.5 Tabs by mouth every twelve (12) hours. Qty: 60 Tab, Refills: 0      azithromycin (ZITHROMAX) 250 mg tablet Take 1 Tab by mouth daily. Qty: 3 Tab, Refills: 0    Associated Diagnoses: Hypoxia      amoxicillin-clavulanate (Augmentin) 875-125 mg per tablet Take 1 Tab by mouth two (2) times a day for 5 days. Qty: 10 Tab, Refills: 0      Saccharomyces boulardii (FLORASTOR) 250 mg capsule Take 2 Caps by mouth two (2) times a day for 7 days. Qty: 28 Cap, Refills: 0             Procedures : none     Consults: None      PHYSICAL EXAM   Visit Vitals  BP (!) 169/93 (BP 1 Location: Left arm, BP Patient Position: Sitting)   Pulse 76   Temp 97.8 °F (36.6 °C)   Resp 18   Ht 5' 2\" (1.575 m)   Wt 53.1 kg (117 lb)   SpO2 100%   BMI 21.40 kg/m²     General: Awake, cooperative, no acute distress    HEENT: NC, Atraumatic. PERRLA, EOMI. Anicteric sclerae. Lungs:  CTA Bilaterally.  No Wheezing/Rhonchi/Rales. Heart:  Regular  rhythm,  No murmur, No Rubs, No Gallops  Abdomen: Soft, Non distended, Non tender. +Bowel sounds,   Extremities: No c/c/e  Psych:   Not anxious or agitated. Neurologic:  No acute neurological deficits. Admission HPI :   Amrik Velasquez is a 68 y.o. female WITHOUT h/o CAD, CHF, CVA, has h/o HTN and brain tumor presents to the ED with a several day h/o dry cough and feeling feverish without actual elevated temperature. EMS found her in resp distress with 02 sats 81-86%, sitting upright, very anxious, tachypnic and diaphoretic. EMS placed her on NC. In ED, she presented tachycardic 116, tachypnic with RR upper 30s-lower 40s, /138 without fever. Given both Ativan and placed on NRB with immediate substantial improvement. ED physician relates that lung exam reveals crackles, rales and scattered rhonchi without edema or distended neck veins. Started on rocephin and azithromycin in ED. ABG on NRB: 7.346/46/173/25/99%. HR dropped from 110s to 60s, RR dropped from 40s to 20s. /77. Able to be weaned back to 3L NC.      Pt works in a [employer], no known exposure to [facility] residents. Hospital Course :   Amrik Velasquez is a 68 y.o. female WITHOUT h/o CAD, CHF, CVA, has h/o HTN was admitted for hypoxia and pna/uti. She received azithromycin and rocephin for pna treatment. She is able to be off nc O2 soon after admissio, no sob while walking around in her room. covid 19 is \"not detected\". She remained afebrile, no leukocytosis/no sob. We started medication for her BP controlled. On discharge, her blood cx no growth for 2 days. Pt strongly requested to be d/c home. She will be d.c home with po abx for pna treatment. Also need to f/u with pcp for bp medication adjustment. Discharge planning discussed with patient, pt agrees  with the plan and no questions and concerns at this point.        Activity: Activity as tolerated    Diet: Regular Diet    Follow-up: PCP     Disposition: home     Minutes spent on discharge: 45 min       Labs: Results:       Chemistry Recent Labs     06/01/20  0400 05/31/20  0345 05/30/20  0443   GLU 99 99 226*    140 140   K 3.7 4.1 3.6    108 110   CO2 26 27 22   BUN 17 16 19*   CREA 0.87 0.73 0.88   CA 8.8 8.7 9.2   AGAP 5 5 8   BUCR 20 22* 22*    111 126*   TP 6.3* 6.5 7.5   ALB 2.9* 3.1* 3.6   GLOB 3.4 3.4 3.9   AGRAT 0.9 0.9 0.9      CBC w/Diff Recent Labs     06/01/20  0400 05/31/20 0345 05/30/20  0443   WBC 5.0 5.5 5.2   RBC 4.67 4.65 4.69   HGB 13.5 13.3 13.5   HCT 42.1 41.8 42.2    199 201   GRANS 55 59 71   LYMPH 31 27 21   EOS 2 2 2      Cardiac Enzymes Recent Labs     05/30/20  0443      CKND1 2.5      Coagulation Recent Labs     06/01/20  0400 05/31/20  0345   PTP 14.0 14.7   INR 1.1 1.2   APTT 26.0 27.3       Lipid Panel Lab Results   Component Value Date/Time    Cholesterol, total 163 05/31/2020 03:45 AM    HDL Cholesterol 68 (H) 05/31/2020 03:45 AM    LDL, calculated 82.2 05/31/2020 03:45 AM    VLDL, calculated 12.8 05/31/2020 03:45 AM    Triglyceride 64 05/31/2020 03:45 AM    CHOL/HDL Ratio 2.4 05/31/2020 03:45 AM      BNP No results for input(s): BNPP in the last 72 hours. Liver Enzymes Recent Labs     06/01/20  0400   TP 6.3*   ALB 2.9*         Thyroid Studies Lab Results   Component Value Date/Time    TSH 0.59 05/31/2020 03:45 AM          @micro    Significant Diagnostic Studies: Xr Chest Port    Result Date: 5/30/2020  EXAM: One view chest x-ray CLINICAL INDICATION/HISTORY: Dyspnea. COMPARISON: 12/01/2019. TECHNIQUE: Single AP view of the chest was obtained. _______________ FINDINGS: HEART, VESSELS, MEDIASTINUM: Heart size is normal. No vascular congestion. There is atherosclerosis of the thoracic aorta. LUNGS, PLEURAL SPACES: Diffuse interstitial and alveolar opacities throughout bilateral lungs. No effusion or pneumothorax.  BONY THORAX, SOFT TISSUES: Unremarkable. _______________     IMPRESSION: Bilateral interstitial and alveolar opacities representing any combination of edema, atelectasis, or pneumonia.             [facility]     CC: None

## 2020-06-01 NOTE — ROUTINE PROCESS
1905 Verbal shift change  Received from Kirsten, (outgoing nurse), to GEMA Andersen (incoming)  Pt. Is AOX 4. IV SL, Pt. denies pain  pain at this time. Report included the following information SBAR, Kardex, Procedure Summary, Intake/Output, MAR, Recent Results, Med Rec Status and Cardiac Rhythm @ NSR. Will resume care and monitor Pt. condition. 2000 Pt. Head to toe Assessment Done and documented. Pt. Educated on call bell when in need of help and assistance. Pt. verbalized understanding. Pt. Given night meds. 2250  Pt. Given night meds, no complaints made. 0000  Pt. Denies pain, resting comfortably in bed. 
 
0200  Pt. Made no complaints. 0400  Pt. Given HCG bath, change gown and pads, labs done and abx given. 0500  ECG done. 0630 Pt. Made no complaints. Verbal Shift changed report given to [employer], RN (oncoming RN) on Pt. Condition. Report consisted of patients Situation, History, Activities, intake/output,  Background, Assessment and Recommendations(SBAR). Information from the following report(s) Kardex, order Summary, Lab results and MAR was reviewed with the receiving nurse. Opportunity for questions and clarification was provided.

## 2020-06-01 NOTE — PROGRESS NOTES
Verbal shift change report given to [address] [place] [address] (oncoming nurse) by Monica Carrera RN (offgoing nurse). Report included the following information SBAR, Kardex, ED Summary, Intake/Output, MAR, Accordion and Recent Results. 2881 Shift assessment complete. Patient transitioned to room air. O 2 sat remained at 100%. 1045 Discharge instructions reviewed with patient. Shift Summary: Shift uneventful. No complaints of chest pain or shortness of breath.

## 2020-06-01 NOTE — PROGRESS NOTES
Plan: home with return to work    Spoke with pt via phone into room. Introduced CM and role to pt, verified and updated information via face sheet. Pt lives with her son, he is her emergency contact, verified his phone number. Pt has daughter, pt declined to list her as contact. Pt works at [employer], plans return when able. No DME needs at this time. Pt will need new PCP appointment at discharge as she has not seen Dr. Lucie Nagy in 2 yrs; pt agreeable to returning to [address]. Will ask [facility] to screen pt for assistance with hospital costs; pt states she only has Medicare part A- if so, she may not qualify for Medicaid since she does not have part B. CM will continue to follow. 1038- noted discharge, spoke with pt who states her COVID is negative and MD is letting her go home. Son is coming to get her, [facility] appointment being made by [facility], requesting Friday appointment if possible.  Will place on AVS.        Care Management Interventions  PCP Verified by CM: Yes(will need PCP at discharge)  Transition of Care Consult (CM Consult): Discharge Planning  Current Support Network: Relative's Home  Discharge Location  Discharge Placement: Home with family assistance

## 2020-06-01 NOTE — PROGRESS NOTES
Son called the floor and talked with him. He requested to release pt with signing AMA. Pt does not request it.

## 2020-06-01 NOTE — ACP (ADVANCE CARE PLANNING)
Advance Care Planning     Advance Care Planning Activator (Inpatient)  Conversation Note      Date of ACP Conversation: 06/01/20     Conversation Conducted with:   Patient with Decision Making Capacity    ACP Activator: Ofelia Morales RN    *When [employer] makes decisions on behalf of the incapacitated patient: Decision Maker is asked to consider and make decisions based on patient values, known preferences, or best interests. Health Care Decision Maker:    Current Designated Health Care Decision Maker:   (If there is a valid [place] named in the [address]\" box in the ACP activity, but it is not visible above, be sure to open that field and then select the health care decision maker relationship (ie \"primary\") in the blank space to the right of the name.) Validate  this information as still accurate & up-to-date; edit [place] field as needed.)    Note: Assess and validate information in current ACP documents, as indicated. If no Decision Maker listed above or available through scanned documents, then:    If no Authorized Decision Maker has previously been identified, then patient chooses [place]:  \"Who would you like to name as your primary health care decision-maker? \"    Name: Olga Guidry Relationship: jessica Phone number: [phone]  \"Can this person be reached easily? \" YES  \"Who would you like to name as your back-up decision maker? \"  declines  Name: n/a Relationship: n/a Phone number:n/a      Note: If the relationship of these Decision-Makers to the patient does NOT follow your state's Next of Kin hierarchy, recommend that patient complete ACP document that meets state-specific requirements to allow them to act on the patient's behalf when appropriate. Care Preferences    Ventilation:   \"If you were in your present state of health and suddenly became very ill and were unable to breathe on your own, what would your preference be about the use of a ventilator (breathing machine) if it were available to you? \"  pt declines to discuss at this time    I  \"If your health worsens and it becomes clear that your chance of recovery is unlikely, what would your preference be about the use of a ventilator (breathing machine) if it were available to you? \" pt declines to discuss      Resuscitation  \"CPR works best to restart the heart when there is a sudden event, like a heart attack, in someone who is otherwise healthy. Unfortunately, CPR does not typically restart the heart for people who have serious health conditions or who are very sick. \"    \"In the event your heart stopped as a result of an underlying serious health condition, would you want attempts to be made to restart your heart (answer \"yes\" for attempt to resuscitate) or would you prefer a natural death (answer \"no\" for do not attempt to resuscitate)? \" pt declines to discuss      NOTE: If the patient has a valid advance directive AND now provides care preference(s) that are inconsistent with that prior directive, advise the patient to consider either: creating a new advance directive that complies with state-specific requirements; or, if that is not possible, orally revoking that prior directive in accordance with state-specific requirements, which must be documented in the EHR. [x] Yes  [] No   Educated Patient / Marylou Santos regarding differences between Advance Directives and portable DNR orders.     Length of ACP Conversation in minutes:      Conversation Outcomes:  [x] ACP discussion completed  [] Existing advance directive reviewed with patient; no changes to patient's previously recorded wishes     [] New Advance Directive completed   [] Portable Do Not Resuscitate prepared for Provider review and signature  [] POLST/POST/MOLST/MOST prepared for Provider review and signature      Follow-up plan:    [] Schedule follow-up conversation to continue planning  [x] Referred individual to Provider for additional questions/concerns   [] Advised patient/agent/surrogate to review completed ACP document and update if needed with changes in condition, patient preferences or care setting     [] This note routed to one or more involved healthcare providers

## 2020-06-01 NOTE — PROGRESS NOTES
Problem: Breathing Pattern - Ineffective  Goal: *Absence of hypoxia  Outcome: Progressing Towards Goal  Goal: *Use of effective breathing techniques  Outcome: Progressing Towards Goal     Problem: Patient Education: Go to Patient Education Activity  Goal: Patient/Family Education  Outcome: Progressing Towards Goal     Problem: Falls - Risk of  Goal: *Absence of Falls  Description: Document yUen Meka Fall Risk and appropriate interventions in the flowsheet.   Outcome: Progressing Towards Goal  Note: Fall Risk Interventions:            Medication Interventions: Assess postural VS orthostatic hypotension, Evaluate medications/consider consulting pharmacy, Patient to call before getting OOB, Teach patient to arise slowly    Elimination Interventions: Call light in reach

## 2020-06-01 NOTE — PROGRESS NOTES
Problem: Breathing Pattern - Ineffective  Goal: *Absence of hypoxia  Outcome: Progressing Towards Goal  Goal: *Use of effective breathing techniques  Outcome: Progressing Towards Goal     Problem: Patient Education: Go to Patient Education Activity  Goal: Patient/Family Education  Outcome: Progressing Towards Goal     Problem: Falls - Risk of  Goal: *Absence of Falls  Description: Document Danney Mess Fall Risk and appropriate interventions in the flowsheet.   Outcome: Progressing Towards Goal  Note: Fall Risk Interventions:            Medication Interventions: Evaluate medications/consider consulting pharmacy, Bed/chair exit alarm    Elimination Interventions: Call light in reach, Bed/chair exit alarm

## 2020-06-05 LAB
BACTERIA SPEC CULT: NORMAL
BACTERIA SPEC CULT: NORMAL
SERVICE CMNT-IMP: NORMAL
SERVICE CMNT-IMP: NORMAL

## 2021-05-29 ENCOUNTER — HOSPITAL ENCOUNTER (EMERGENCY)
Age: 71
Discharge: HOME | End: 2021-05-29
Attending: EMERGENCY MEDICINE

## 2021-05-29 ENCOUNTER — APPOINTMENT (OUTPATIENT)
Dept: GENERAL RADIOLOGY | Age: 71
End: 2021-05-29
Attending: EMERGENCY MEDICINE

## 2021-05-29 VITALS
SYSTOLIC BLOOD PRESSURE: 170 MMHG | BODY MASS INDEX: 23 KG/M2 | WEIGHT: 125 LBS | DIASTOLIC BLOOD PRESSURE: 96 MMHG | OXYGEN SATURATION: 95 % | TEMPERATURE: 97.7 F | HEIGHT: 62 IN | RESPIRATION RATE: 16 BRPM | HEART RATE: 96 BPM

## 2021-05-29 DIAGNOSIS — I10 ESSENTIAL HYPERTENSION: ICD-10-CM

## 2021-05-29 DIAGNOSIS — R10.13 DYSPEPSIA: Primary | ICD-10-CM

## 2021-05-29 LAB
ALBUMIN SERPL-MCNC: 3.7 G/DL (ref 3.4–5)
ALBUMIN/GLOB SERPL: 0.9 {RATIO} (ref 0.8–1.7)
ALP SERPL-CCNC: 116 U/L (ref 45–117)
ALT SERPL-CCNC: 74 U/L (ref 13–56)
ANION GAP SERPL CALC-SCNC: 6 MMOL/L (ref 3–18)
AST SERPL-CCNC: 111 U/L (ref 10–38)
ATRIAL RATE: 93 BPM
BASOPHILS # BLD: 0 K/UL (ref 0–0.1)
BASOPHILS NFR BLD: 0 % (ref 0–2)
BILIRUB SERPL-MCNC: 0.3 MG/DL (ref 0.2–1)
BUN SERPL-MCNC: 20 MG/DL (ref 7–18)
BUN/CREAT SERPL: 23 (ref 12–20)
CALCIUM SERPL-MCNC: 9.3 MG/DL (ref 8.5–10.1)
CALCULATED P AXIS, ECG09: 61 DEGREES
CALCULATED R AXIS, ECG10: 8 DEGREES
CALCULATED T AXIS, ECG11: 80 DEGREES
CHLORIDE SERPL-SCNC: 108 MMOL/L (ref 100–111)
CK MB CFR SERPL CALC: 2.9 % (ref 0–4)
CK MB CFR SERPL CALC: 3 % (ref 0–4)
CK MB SERPL-MCNC: 5.7 NG/ML (ref 5–25)
CK MB SERPL-MCNC: 6.6 NG/ML (ref 5–25)
CK SERPL-CCNC: 187 U/L (ref 26–192)
CK SERPL-CCNC: 230 U/L (ref 26–192)
CO2 SERPL-SCNC: 28 MMOL/L (ref 21–32)
CREAT SERPL-MCNC: 0.88 MG/DL (ref 0.6–1.3)
DIAGNOSIS, 93000: NORMAL
DIFFERENTIAL METHOD BLD: ABNORMAL
EOSINOPHIL # BLD: 0 K/UL (ref 0–0.4)
EOSINOPHIL NFR BLD: 1 % (ref 0–5)
ERYTHROCYTE [DISTWIDTH] IN BLOOD BY AUTOMATED COUNT: 13 % (ref 11.6–14.5)
GLOBULIN SER CALC-MCNC: 3.9 G/DL (ref 2–4)
GLUCOSE SERPL-MCNC: 128 MG/DL (ref 74–99)
HCT VFR BLD AUTO: 42.9 % (ref 35–45)
HGB BLD-MCNC: 14.2 G/DL (ref 12–16)
LIPASE SERPL-CCNC: 84 U/L (ref 73–393)
LYMPHOCYTES # BLD: 1.3 K/UL (ref 0.9–3.6)
LYMPHOCYTES NFR BLD: 27 % (ref 21–52)
MCH RBC QN AUTO: 29.6 PG (ref 24–34)
MCHC RBC AUTO-ENTMCNC: 33.1 G/DL (ref 31–37)
MCV RBC AUTO: 89.6 FL (ref 74–97)
MONOCYTES # BLD: 0.7 K/UL (ref 0.05–1.2)
MONOCYTES NFR BLD: 14 % (ref 3–10)
NEUTS SEG # BLD: 2.7 K/UL (ref 1.8–8)
NEUTS SEG NFR BLD: 58 % (ref 40–73)
P-R INTERVAL, ECG05: 186 MS
PLATELET # BLD AUTO: 186 K/UL (ref 135–420)
PLATELET COMMENTS,PCOM: ABNORMAL
PMV BLD AUTO: 11.5 FL (ref 9.2–11.8)
POTASSIUM SERPL-SCNC: 3.8 MMOL/L (ref 3.5–5.5)
PROT SERPL-MCNC: 7.6 G/DL (ref 6.4–8.2)
Q-T INTERVAL, ECG07: 394 MS
QRS DURATION, ECG06: 98 MS
QTC CALCULATION (BEZET), ECG08: 489 MS
RBC # BLD AUTO: 4.79 M/UL (ref 4.2–5.3)
RBC MORPH BLD: ABNORMAL
SODIUM SERPL-SCNC: 142 MMOL/L (ref 136–145)
TROPONIN I SERPL-MCNC: 0.04 NG/ML (ref 0–0.04)
TROPONIN I SERPL-MCNC: 0.04 NG/ML (ref 0–0.04)
VENTRICULAR RATE, ECG03: 93 BPM
WBC # BLD AUTO: 4.7 K/UL (ref 4.6–13.2)

## 2021-05-29 PROCEDURE — 74011250636 HC RX REV CODE- 250/636: Performed by: EMERGENCY MEDICINE

## 2021-05-29 PROCEDURE — 93005 ELECTROCARDIOGRAM TRACING: CPT

## 2021-05-29 PROCEDURE — 96374 THER/PROPH/DIAG INJ IV PUSH: CPT

## 2021-05-29 PROCEDURE — C9113 INJ PANTOPRAZOLE SODIUM, VIA: HCPCS | Performed by: EMERGENCY MEDICINE

## 2021-05-29 PROCEDURE — 80074 ACUTE HEPATITIS PANEL: CPT

## 2021-05-29 PROCEDURE — 96375 TX/PRO/DX INJ NEW DRUG ADDON: CPT

## 2021-05-29 PROCEDURE — 85025 COMPLETE CBC W/AUTO DIFF WBC: CPT

## 2021-05-29 PROCEDURE — 74011250637 HC RX REV CODE- 250/637: Performed by: EMERGENCY MEDICINE

## 2021-05-29 PROCEDURE — 82553 CREATINE MB FRACTION: CPT

## 2021-05-29 PROCEDURE — 71045 X-RAY EXAM CHEST 1 VIEW: CPT

## 2021-05-29 PROCEDURE — 83690 ASSAY OF LIPASE: CPT

## 2021-05-29 PROCEDURE — 74011000250 HC RX REV CODE- 250: Performed by: EMERGENCY MEDICINE

## 2021-05-29 PROCEDURE — 99284 EMERGENCY DEPT VISIT MOD MDM: CPT

## 2021-05-29 PROCEDURE — 80053 COMPREHEN METABOLIC PANEL: CPT

## 2021-05-29 RX ORDER — HYDROCHLOROTHIAZIDE 25 MG/1
25 TABLET ORAL DAILY
Qty: 30 TABLET | Refills: 0 | Status: SHIPPED | OUTPATIENT
Start: 2021-05-29 | End: 2021-06-28

## 2021-05-29 RX ORDER — METOPROLOL TARTRATE 5 MG/5ML
5 INJECTION INTRAVENOUS
Status: COMPLETED | OUTPATIENT
Start: 2021-05-29 | End: 2021-05-29

## 2021-05-29 RX ORDER — PANTOPRAZOLE SODIUM 40 MG/1
40 TABLET, DELAYED RELEASE ORAL DAILY
Qty: 10 TABLET | Refills: 0 | Status: SHIPPED | OUTPATIENT
Start: 2021-05-29 | End: 2021-06-08

## 2021-05-29 RX ORDER — PANTOPRAZOLE SODIUM 40 MG/10ML
40 INJECTION, POWDER, LYOPHILIZED, FOR SOLUTION INTRAVENOUS
Status: COMPLETED | OUTPATIENT
Start: 2021-05-29 | End: 2021-05-29

## 2021-05-29 RX ORDER — METOPROLOL TARTRATE 25 MG/1
12.5 TABLET, FILM COATED ORAL 2 TIMES DAILY
Qty: 30 TABLET | Refills: 0 | Status: SHIPPED | OUTPATIENT
Start: 2021-05-29 | End: 2021-06-28

## 2021-05-29 RX ADMIN — METOPROLOL TARTRATE 5 MG: 5 INJECTION INTRAVENOUS at 04:04

## 2021-05-29 RX ADMIN — LIDOCAINE HYDROCHLORIDE 40 ML: 20 SOLUTION ORAL; TOPICAL at 04:04

## 2021-05-29 RX ADMIN — PANTOPRAZOLE SODIUM 40 MG: 40 INJECTION, POWDER, FOR SOLUTION INTRAVENOUS at 04:04

## 2021-05-29 NOTE — DISCHARGE INSTRUCTIONS
Take Protonix as directed. Take metoprolol and hydrochlorothiazide as directed. Recommend bland diet. Hydrate well. Follow-up outpatient with primary care. I provided Dr. Carmelo Montero contact information as well as [facility] clinic. Follow-up outpatient with GI and cardiology. Their information has been provided to you. Return to the emergency department if you experience any abdominal discomfort, abdominal pain, nausea, vomiting, chest pain, shortness of breath, numbness, weakness, headache, blurred vision or any other concerns.

## 2021-05-29 NOTE — ED PROVIDER NOTES
EMERGENCY DEPARTMENT HISTORY AND PHYSICAL EXAM    03:40 AM  Date: 5/29/2021  Patient Name: Lizz Garza    History of Presenting Illness     Chief Complaint   Patient presents with    Epigastric Pain         History Provided By: Patient    Lizz Garza is a 65 y.o. female with PMHX of brain tumor, hypertension noncompliant with medications who presents to the emergency department C/O bubbling sensation in the upper abdomen after eating relieved with Tums. Tums only associated with eating. Patient reports that she has had the symptoms in the past, states in the past week they returned. Today she took Tums 3 times, ports improvement in her symptoms, and decided to come to the ED for evaluation. She denies any fever, chills, chest pain, shortness of breath, abdominal pain, nausea, vomiting or diarrhea. States she feels like she can hear a gurgling sensation sometimes after she eats. She denies any history aside from her hypertension. Noncompliant with her medications. No associated headache, blurred vision, chest pain, shortness of breath, or any other symptoms. Denies any cardiac history, no history of CVA/TIA/MI. Patient reports that she works at [employer] and is screened daily for [place]. Has not yet received the Covid vaccine. Denies any Covid concerns. PCP: Tisha Collier MD    Current Outpatient Medications   Medication Sig Dispense Refill    hydroCHLOROthiazide (HYDRODIURIL) 25 mg tablet Take 1 Tablet by mouth daily for 30 days. 30 Tablet 0    metoprolol tartrate (LOPRESSOR) 25 mg tablet Take 0.5 Tablets by mouth two (2) times a day for 30 days. 30 Tablet 0    pantoprazole (Protonix) 40 mg tablet Take 1 Tablet by mouth daily for 10 days. 10 Tablet 0    azithromycin (ZITHROMAX) 250 mg tablet Take 1 Tab by mouth daily.  (Patient not taking: Reported on 5/29/2021) 3 Tab 0       Past History     Past Medical History:  Past Medical History:   Diagnosis Date    Brain tumor ([facility] [address]     Hypertension        Past Surgical History:  Past Surgical History:   Procedure Laterality Date    NEUROLOGICAL PROCEDURE UNLISTED      Brain tumor       Family History:  No family history on file. Social History:  Social History     Tobacco Use    Smoking status: Never Smoker    Smokeless tobacco: Never Used   Substance Use Topics    Alcohol use: No    Drug use: No       Allergies:  No Known Allergies      Review of Systems   Constitutional: No fevers. Eyes: No visual symptoms. ENT: No sore throat, runny nose, or ear pain. Respiratory: No cough, dyspnea, or wheezing. Cardiovascular: No chest pain, palpitations, or heaviness. Gastrointestinal: No abdominal pain, nausea, vomiting, diarrhea. + Bubbling in the upper abdomen  Genitourinary: No dysuria, frequency, or urgency. Musculoskeletal: No joint pain or swelling. Integumentary: No rashes. Neurological: No headaches, sensory or motor symptoms. All other systems negative. Physical Exam     Vitals:    05/29/21 0500 05/29/21 0515 05/29/21 0530 05/29/21 0545   BP: (!) 163/88 (!) 164/86 (!) 157/95 (!) 170/96   Pulse:       Resp:       Temp:       SpO2: 92% 95% 92% 95%   Weight:       Height:         Physical Exam    Nursing notes and vital signs reviewed    Constitutional: Non toxic appearing, no acute distress. Is not diaphoretic. Head: Normocephalic, Atraumatic  Eyes: Conjunctiva clear,EOMI  Neck: Supple, normal range of motion. No stridor or tracheal deviation.    Cardiovascular: Regular rate and rhythm, no murmurs, rubs, or gallops  Chest: Normal work of breathing and chest excursion bilaterally  Lungs: Clear to ausculation bilaterally  Abdomen: Soft, non tender, non distended, normoactive bowel sounds  Back: No evidence of trauma or deformity  Extremities: No evidence of trauma or deformity, no LE edema  Skin: Warm and dry, normal cap refill  Neuro: Alert and appropriate, normal speech, strength and sensation full and symmetric bilaterally, normal gait, normal coordination  Psychiatric: Normal mood and affect      Diagnostic Study Results     Labs -     Recent Results (from the past 12 hour(s))   EKG, 12 LEAD, INITIAL    Collection Time: 05/29/21  2:10 AM   Result Value Ref Range    Ventricular Rate 93 BPM    Atrial Rate 93 BPM    P-R Interval 186 ms    QRS Duration 98 ms    Q-T Interval 394 ms    QTC Calculation (Bezet) 489 ms    Calculated P Axis 61 degrees    Calculated R Axis 8 degrees    Calculated T Axis 80 degrees    Diagnosis       Normal sinus rhythm  Left atrial enlargement  Left ventricular hypertrophy with repolarization abnormality  Cannot rule out Septal infarct (cited on or before 04-DEC-2019)  Abnormal ECG  When compared with ECG of 01-JUN-2020 05:00,  T wave inversion no longer evident in Inferior leads  T wave amplitude has increased in Anterior leads  T wave inversion now evident in Lateral leads     CBC WITH AUTOMATED DIFF    Collection Time: 05/29/21  2:18 AM   Result Value Ref Range    WBC 4.7 4.6 - 13.2 K/uL    RBC 4.79 4.20 - 5.30 M/uL    HGB 14.2 12.0 - 16.0 g/dL    HCT 42.9 35.0 - 45.0 %    MCV 89.6 74.0 - 97.0 FL    MCH 29.6 24.0 - 34.0 PG    MCHC 33.1 31.0 - 37.0 g/dL    RDW 13.0 11.6 - 14.5 %    PLATELET 223 462 - 531 K/uL    MPV 11.5 9.2 - 11.8 FL    NEUTROPHILS 58 40 - 73 %    LYMPHOCYTES 27 21 - 52 %    MONOCYTES 14 (H) 3 - 10 %    EOSINOPHILS 1 0 - 5 %    BASOPHILS 0 0 - 2 %    ABS. NEUTROPHILS 2.7 1.8 - 8.0 K/UL    ABS. LYMPHOCYTES 1.3 0.9 - 3.6 K/UL    ABS. MONOCYTES 0.7 0.05 - 1.2 K/UL    ABS. EOSINOPHILS 0.0 0.0 - 0.4 K/UL    ABS.  BASOPHILS 0.0 0.0 - 0.1 K/UL    DF MANUAL      PLATELET COMMENTS ADEQUATE PLATELETS      RBC COMMENTS NORMOCYTIC, NORMOCHROMIC     METABOLIC PANEL, COMPREHENSIVE    Collection Time: 05/29/21  2:18 AM   Result Value Ref Range    Sodium 142 136 - 145 mmol/L    Potassium 3.8 3.5 - 5.5 mmol/L    Chloride 108 100 - 111 mmol/L    CO2 28 21 - 32 mmol/L    Anion gap 6 3.0 - 18 mmol/L    Glucose 128 (H) 74 - 99 mg/dL    BUN 20 (H) 7.0 - 18 MG/DL    Creatinine 0.88 0.6 - 1.3 MG/DL    BUN/Creatinine ratio 23 (H) 12 - 20      GFR est AA >60 >60 ml/min/1.73m2    GFR est non-AA >60 >60 ml/min/1.73m2    Calcium 9.3 8.5 - 10.1 MG/DL    Bilirubin, total 0.3 0.2 - 1.0 MG/DL    ALT (SGPT) 74 (H) 13 - 56 U/L    AST (SGOT) 111 (H) 10 - 38 U/L    Alk. phosphatase 116 45 - 117 U/L    Protein, total 7.6 6.4 - 8.2 g/dL    Albumin 3.7 3.4 - 5.0 g/dL    Globulin 3.9 2.0 - 4.0 g/dL    A-G Ratio 0.9 0.8 - 1.7     CARDIAC PANEL,(CK, CKMB & TROPONIN)    Collection Time: 05/29/21  2:18 AM   Result Value Ref Range    CK - MB 6.6 (H) <3.6 ng/ml    CK-MB Index 2.9 0.0 - 4.0 %     (H) 26 - 192 U/L    Troponin-I, QT 0.04 0.0 - 0.045 NG/ML   LIPASE    Collection Time: 05/29/21  2:18 AM   Result Value Ref Range    Lipase 84 73 - 393 U/L   CARDIAC PANEL,(CK, CKMB & TROPONIN)    Collection Time: 05/29/21  4:56 AM   Result Value Ref Range    CK - MB 5.7 (H) <3.6 ng/ml    CK-MB Index 3.0 0.0 - 4.0 %     26 - 192 U/L    Troponin-I, QT 0.04 0.0 - 0.045 NG/ML       Radiologic Studies -   XR CHEST PORT   Final Result      Cardiomegaly with evidence of mild interstitial edema and a small left pleural   effusion. CT Results  (Last 48 hours)    None        CXR Results  (Last 48 hours)               05/29/21 0257  XR CHEST PORT Final result    Impression:      Cardiomegaly with evidence of mild interstitial edema and a small left pleural   effusion. Narrative:  EXAM: XR CHEST PORT       CLINICAL INDICATION/HISTORY: cp     > Additional: None. COMPARISON: May 30, 2020       TECHNIQUE: Portable chest       _______________       FINDINGS:       SUPPORT DEVICES: None. HEART AND MEDIASTINUM: The heart is enlarged. Atheromatous calcifications noted   in the aortic arch. LUNGS AND PLEURAL SPACES: Mild pulmonary vascular congestion with faint   interstitial opacities noted. Possible small left pleural effusion. BONY THORAX AND SOFT TISSUES: No acute osseous abnormality. _______________                 Medications given in the ED-  Medications   metoprolol (LOPRESSOR) injection 5 mg (5 mg IntraVENous Given 5/29/21 0404)   pantoprazole (PROTONIX) injection 40 mg (40 mg IntraVENous Given 5/29/21 0404)   mylanta/viscous lidocaine (GI COCKTAIL) (40 mL Oral Given 5/29/21 0404)         Medical Decision Making   I am the first provider for this patient. I reviewed the vital signs, available nursing notes, past medical history, past surgical history, family history and social history. Vital Signs-Reviewed the patient's vital signs. Pulse Oximetry Analysis -98% on room air, not hypoxic     Cardiac Monitor:  Rate: 96 bpm  Rhythm: Sinus    EKG interpretation: (Preliminary)  EKG read by Dr. Twila Davila at 2:10 AM  Sinus rhythm, rate 93, LVH, QRS 98, QTc 489. T wave inversions in the lateral leads, nonspecific ST abnormalities, no STEMI  Comparison: 6/1/2020, T wave inversions no longer present in inferior leads    Records Reviewed: Nursing Notes, Old Medical Records and Previous electrocardiograms    Provider Notes (Medical Decision Making): Leann Anglin is a 65 y.o. female PMHX of brain tumor, hypertension noncompliant with medications who presents to the emergency department C/O bubbling sensation in the upper abdomen with no associated chest pain, shortness of breath, abdominal pain nausea or vomiting. On exam patient is resting comfortably in no acute distress with heart regular rate and rhythm, clear breath sounds, abdomen is soft and nontender. Given age will obtain 2 sets of cardiac markers, will do abdominal labs, give Protonix, GI cocktail and will reassess. Given hypertension will give Lopressor. Will reassess. Procedures:  Procedures    ED Course: Progress Notes, Reevaluation, and Consults:    03:40 AM Initial assessment performed.  The patients presenting problems have been discussed, and they/their family are in agreement with the care plan formulated and outlined with them. I have encouraged them to ask questions as they arise throughout their visit. Blood pressure 157/95 after administration of Lopressor. Cardiac markers normal x2. Mild elevation in LFTs however no abdominal pain or tenderness. Will send hepatitis panel. X-ray consistent with cardiomegaly and chronic interstitial findings. 6 AM: Patient seen and reassessed resting comfortably at bedside and is eager for discharge. Will discharge with prescriptions for Lopressor and hydrochlorothiazide as she was on these previously and ran out. In addition will give Protonix. Given primary care follow-up information as well as GI and cardiology. Instructed for bland diet. Strict return precautions discussed. Patient in agreement with discharge plan and return precautions. Offering no questions or complaints. Diagnosis and Disposition       DISCHARGE NOTE:    Xochitl Brandt's  results have been reviewed with her. She has been counseled regarding her diagnosis. She verbally conveys understanding and agreement of the signs, symptoms, diagnosis, treatment and prognosis and additionally agrees to follow up as recommended with Dr. Shiela Parada MD in 24 - 48 hours. She also agrees with the care-plan and conveys that all of her questions have been answered. I have also put together some discharge instructions for her that include: 1) educational information regarding their diagnosis, 2) how to care for their diagnosis at home, as well a 3) list of reasons why they would want to return to the ED prior to their follow-up appointment, should their condition change. CLINICAL IMPRESSION:    1. Dyspepsia    2. Essential hypertension        PLAN:  1. D/C Home  2.    Discharge Medication List as of 5/29/2021  6:08 AM      START taking these medications    Details   hydroCHLOROthiazide (HYDRODIURIL) 25 mg tablet Take 1 Tablet by mouth daily for 30 days. , Normal, Disp-30 Tablet, R-0      metoprolol tartrate (LOPRESSOR) 25 mg tablet Take 0.5 Tablets by mouth two (2) times a day for 30 days. , Normal, Disp-30 Tablet, R-0      pantoprazole (Protonix) 40 mg tablet Take 1 Tablet by mouth daily for 10 days. , Normal, Disp-10 Tablet, R-0         CONTINUE these medications which have NOT CHANGED    Details   azithromycin (ZITHROMAX) 250 mg tablet Take 1 Tab by mouth daily. , Normal, Disp-3 Tab, R-0           3. Follow-up Information     Follow up With Specialties Details Why Contact Info    Michael Batista,  Internal Medicine Schedule an appointment as soon as possible for a visit   [address] [facility]  [phone]      Jose De La Cruz MD Gastroenterology Schedule an appointment as soon as possible for a visit   [address] [place] [address] [facility] [address] [phone]      Arlette Shelby MD Cardiology, Internal Medicine Schedule an appointment as soon as possible for a visit   [address] [facility]  [address]   [phone]      [facility] EMERGENCY DEPT Emergency Medicine  If symptoms worsen [address] [place]  [phone]    [address] [place]  Schedule an appointment as soon as possible for a visit   [address] [facility], [address] [place]  [phone]        _______________________________      Please note that this dictation was completed with [employer], the [employer] voice recognition software. Quite often unanticipated grammatical, syntax, homophones, and other interpretive errors are inadvertently transcribed by the computer software. Please disregard these errors. Please excuse any errors that have escaped final proofreading.

## 2021-05-29 NOTE — ED NOTES
Pt ambulatory to ED bed with c/o bubbling sensation in the upper ABD for the past week. Denies any CP, SOB, dizziness, weakness, ABD pain, NVD, bowel or urinary complaints. She states she has been taking Tums for the past week with no relief of sx. She is AAO x 4 in NAD. Respirations regular/unlabord. Denies additional complaints.

## 2021-05-29 NOTE — ED TRIAGE NOTES
Patient reports to ED c/c epigastric pain onset 2 weeks. Pt reports pain is intermittent, denies any SOB. Pt has hx of HTN, but is non compliant with b/p medication.

## 2021-05-31 LAB
HAV IGM SER QL: NEGATIVE
HBV CORE IGM SER QL: NEGATIVE
HBV SURFACE AG SER QL: <0.1 INDEX
HBV SURFACE AG SER QL: NEGATIVE
HCV AB SER IA-ACNC: <0.02 INDEX
HCV AB SERPL QL IA: NEGATIVE
HCV COMMENT,HCGAC: NORMAL
SP1: NORMAL
SP2: NORMAL
SP3: NORMAL